# Patient Record
Sex: FEMALE | Race: WHITE | Employment: OTHER | URBAN - METROPOLITAN AREA
[De-identification: names, ages, dates, MRNs, and addresses within clinical notes are randomized per-mention and may not be internally consistent; named-entity substitution may affect disease eponyms.]

---

## 2018-04-25 PROBLEM — R60.0 LOCALIZED EDEMA: Status: ACTIVE | Noted: 2018-04-25

## 2018-06-20 ENCOUNTER — HOSPITAL ENCOUNTER (OUTPATIENT)
Dept: WOMENS IMAGING | Age: 78
Discharge: HOME | End: 2018-06-22
Payer: MEDICARE

## 2018-06-20 DIAGNOSIS — R93.6 ABNORMAL FINDINGS ON DIAGNOSTIC IMAGING OF LIMBS: ICD-10-CM

## 2018-06-20 DIAGNOSIS — Z13.820 ENCOUNTER FOR SCREENING FOR OSTEOPOROSIS: ICD-10-CM

## 2018-06-20 DIAGNOSIS — Z12.31 SCREENING MAMMOGRAM, ENCOUNTER FOR: ICD-10-CM

## 2018-06-20 PROCEDURE — 77080 DXA BONE DENSITY AXIAL: CPT

## 2018-06-20 PROCEDURE — 77063 BREAST TOMOSYNTHESIS BI: CPT

## 2018-12-03 RX ORDER — SIMVASTATIN 40 MG
TABLET ORAL
Qty: 90 TABLET | Refills: 2 | Status: SHIPPED | OUTPATIENT
Start: 2018-12-03 | End: 2019-09-01 | Stop reason: SDUPTHER

## 2018-12-10 ENCOUNTER — OFFICE VISIT (OUTPATIENT)
Dept: PRIMARY CARE CLINIC | Age: 78
End: 2018-12-10
Payer: MEDICARE

## 2018-12-10 VITALS
HEIGHT: 62 IN | RESPIRATION RATE: 16 BRPM | WEIGHT: 244.4 LBS | SYSTOLIC BLOOD PRESSURE: 112 MMHG | DIASTOLIC BLOOD PRESSURE: 82 MMHG | BODY MASS INDEX: 44.98 KG/M2 | OXYGEN SATURATION: 97 % | HEART RATE: 64 BPM

## 2018-12-10 DIAGNOSIS — E03.9 HYPOTHYROIDISM, UNSPECIFIED TYPE: ICD-10-CM

## 2018-12-10 DIAGNOSIS — I10 ESSENTIAL HYPERTENSION: ICD-10-CM

## 2018-12-10 DIAGNOSIS — H60.313 ACUTE DIFFUSE OTITIS EXTERNA OF BOTH EARS: ICD-10-CM

## 2018-12-10 DIAGNOSIS — R60.0 LOCALIZED EDEMA: ICD-10-CM

## 2018-12-10 DIAGNOSIS — Z23 NEED FOR PROPHYLACTIC VACCINATION AGAINST STREPTOCOCCUS PNEUMONIAE (PNEUMOCOCCUS): Primary | ICD-10-CM

## 2018-12-10 DIAGNOSIS — Z13.31 POSITIVE DEPRESSION SCREENING: ICD-10-CM

## 2018-12-10 DIAGNOSIS — H66.003 ACUTE SUPPURATIVE OTITIS MEDIA OF BOTH EARS WITHOUT SPONTANEOUS RUPTURE OF TYMPANIC MEMBRANES, RECURRENCE NOT SPECIFIED: ICD-10-CM

## 2018-12-10 DIAGNOSIS — Z91.81 AT HIGH RISK FOR FALLS: ICD-10-CM

## 2018-12-10 DIAGNOSIS — E78.00 PURE HYPERCHOLESTEROLEMIA: ICD-10-CM

## 2018-12-10 PROCEDURE — 1101F PT FALLS ASSESS-DOCD LE1/YR: CPT | Performed by: NURSE PRACTITIONER

## 2018-12-10 PROCEDURE — 1123F ACP DISCUSS/DSCN MKR DOCD: CPT | Performed by: NURSE PRACTITIONER

## 2018-12-10 PROCEDURE — G0444 DEPRESSION SCREEN ANNUAL: HCPCS | Performed by: NURSE PRACTITIONER

## 2018-12-10 PROCEDURE — G8399 PT W/DXA RESULTS DOCUMENT: HCPCS | Performed by: NURSE PRACTITIONER

## 2018-12-10 PROCEDURE — G0009 ADMIN PNEUMOCOCCAL VACCINE: HCPCS | Performed by: NURSE PRACTITIONER

## 2018-12-10 PROCEDURE — 90732 PPSV23 VACC 2 YRS+ SUBQ/IM: CPT | Performed by: NURSE PRACTITIONER

## 2018-12-10 PROCEDURE — 99214 OFFICE O/P EST MOD 30 MIN: CPT | Performed by: NURSE PRACTITIONER

## 2018-12-10 PROCEDURE — G8427 DOCREV CUR MEDS BY ELIG CLIN: HCPCS | Performed by: NURSE PRACTITIONER

## 2018-12-10 PROCEDURE — 4040F PNEUMOC VAC/ADMIN/RCVD: CPT | Performed by: NURSE PRACTITIONER

## 2018-12-10 PROCEDURE — G8482 FLU IMMUNIZE ORDER/ADMIN: HCPCS | Performed by: NURSE PRACTITIONER

## 2018-12-10 PROCEDURE — G8431 POS CLIN DEPRES SCRN F/U DOC: HCPCS | Performed by: NURSE PRACTITIONER

## 2018-12-10 PROCEDURE — 4130F TOPICAL PREP RX AOE: CPT | Performed by: NURSE PRACTITIONER

## 2018-12-10 PROCEDURE — 1036F TOBACCO NON-USER: CPT | Performed by: NURSE PRACTITIONER

## 2018-12-10 PROCEDURE — G8417 CALC BMI ABV UP PARAM F/U: HCPCS | Performed by: NURSE PRACTITIONER

## 2018-12-10 PROCEDURE — 1090F PRES/ABSN URINE INCON ASSESS: CPT | Performed by: NURSE PRACTITIONER

## 2018-12-10 RX ORDER — CIPROFLOXACIN AND DEXAMETHASONE 3; 1 MG/ML; MG/ML
4 SUSPENSION/ DROPS AURICULAR (OTIC) 2 TIMES DAILY
Qty: 1 BOTTLE | Refills: 0 | Status: SHIPPED | OUTPATIENT
Start: 2018-12-10 | End: 2018-12-17

## 2018-12-10 RX ORDER — DOXYCYCLINE HYCLATE 100 MG/1
100 CAPSULE ORAL 2 TIMES DAILY
Qty: 20 CAPSULE | Refills: 0 | Status: SHIPPED | OUTPATIENT
Start: 2018-12-10 | End: 2018-12-20

## 2018-12-10 ASSESSMENT — ENCOUNTER SYMPTOMS
SHORTNESS OF BREATH: 0
BACK PAIN: 1
VOMITING: 0
ABDOMINAL PAIN: 0
PHOTOPHOBIA: 0
COUGH: 1
RHINORRHEA: 1
NAUSEA: 1
SORE THROAT: 1

## 2018-12-10 ASSESSMENT — PATIENT HEALTH QUESTIONNAIRE - PHQ9
2. FEELING DOWN, DEPRESSED OR HOPELESS: 3
1. LITTLE INTEREST OR PLEASURE IN DOING THINGS: 0
SUM OF ALL RESPONSES TO PHQ9 QUESTIONS 1 & 2: 3
5. POOR APPETITE OR OVEREATING: 3
7. TROUBLE CONCENTRATING ON THINGS, SUCH AS READING THE NEWSPAPER OR WATCHING TELEVISION: 3
8. MOVING OR SPEAKING SO SLOWLY THAT OTHER PEOPLE COULD HAVE NOTICED. OR THE OPPOSITE, BEING SO FIGETY OR RESTLESS THAT YOU HAVE BEEN MOVING AROUND A LOT MORE THAN USUAL: 3
9. THOUGHTS THAT YOU WOULD BE BETTER OFF DEAD, OR OF HURTING YOURSELF: 0
10. IF YOU CHECKED OFF ANY PROBLEMS, HOW DIFFICULT HAVE THESE PROBLEMS MADE IT FOR YOU TO DO YOUR WORK, TAKE CARE OF THINGS AT HOME, OR GET ALONG WITH OTHER PEOPLE: 2
6. FEELING BAD ABOUT YOURSELF - OR THAT YOU ARE A FAILURE OR HAVE LET YOURSELF OR YOUR FAMILY DOWN: 2
SUM OF ALL RESPONSES TO PHQ QUESTIONS 1-9: 20
4. FEELING TIRED OR HAVING LITTLE ENERGY: 3
3. TROUBLE FALLING OR STAYING ASLEEP: 3
SUM OF ALL RESPONSES TO PHQ QUESTIONS 1-9: 20

## 2018-12-10 NOTE — PROGRESS NOTES
[address] [address] PRIMARY CARE  [address] [place] [id number]  Dept: [phone]    Bassam Thorpe is a [de-identified] y.o. female who presents today for her medical conditions/complaintsas noted below. Chief Complaint   Patient presents with    Otalgia     Patient c/o right ear pain that started about 2 weeks ago. At times she will get dizzy. HPI:     Otalgia    There is pain in both ears. This is a recurrent problem. Episode onset: left about 2-3weeks and right comes and goes for months. The problem has been gradually worsening. There has been no fever. The pain is mild. Associated symptoms include coughing, rhinorrhea and a sore throat (scratchy). Pertinent negatives include no abdominal pain, headaches, rash or vomiting. Depression is not so good. When she has 2-3 days of not seein anyone she does not want to get our of bed/. She thnkd prozac is workin gOK.     LDL Cholesterol (mg/dL)   Date Value   04/27/2012 92       (goal LDL is <100)   AST (U/L)   Date Value   02/10/2014 26     ALT (U/L)   Date Value   02/10/2014 31     BUN (mg/dL)   Date Value   05/16/2014 17     BP Readings from Last 3 Encounters:   12/10/18 112/82   08/30/18 122/82   05/08/18 138/78          (goal 120/80)    Past Medical History:   Diagnosis Date    Acute sinusitis     Anxiety     Arthritis     Attic perforation of tympanic membrane, right ear     Colitis     Cramp of limb     Depression     Difficulty breathing     Diverticulosis     Elbow injury     GERD (gastroesophageal reflux disease)     H/O acne     H/O acute bronchitis     H/O breast lump     H/O Clostridium difficile infection     H/O diarrhea     H/O diverticulitis of colon     H/O seborrheic keratosis     H/O: hypothyroidism     Hyperlipidemia     Hypertension     Hypothyroid     Joint pain, hip     OA (osteoarthritis)     Obesity     Polyuria     Shoulder injury     Urine, MG capsule TAKE 1 CAPSULE DAILY 90 capsule 2    levothyroxine (SYNTHROID) 50 MCG tablet TAKE 1 TABLET DAILY 90 tablet 2    esomeprazole (NEXIUM) 20 MG delayed release capsule TAKE 1 CAPSULE DAILY 90 capsule 3    lisinopril-hydrochlorothiazide (PRINZIDE;ZESTORETIC) 20-12.5 MG per tablet TAKE 1 TABLET DAILY 90 tablet 3    furosemide (LASIX) 20 MG tablet Take 1 tablet by mouth daily 90 tablet 3    tolterodine (DETROL LA) 4 MG extended release capsule TAKE 1 CAPSULE DAILY 90 capsule 3    ciclopirox (LOPROX) 0.77 % cream Apply topically 2 times daily. 30 g 0    ammonium lactate (LAC-HYDRIN) 12 % lotion Apply topically daily. 222 mL 3    polyethylene glycol (GLYCOLAX) powder Take 17 g by mouth daily Indications: Constipation      albuterol (PROVENTIL HFA;VENTOLIN HFA) 108 (90 BASE) MCG/ACT inhaler Inhale 2 puffs into the lungs every 6 hours as needed for Wheezing.  albuterol (PROVENTIL) (2.5 MG/3ML) 0.083% nebulizer solution Take 2.5 mg by nebulization every 4 hours as needed for Wheezing        No current facility-administered medications for this visit. Allergies   Allergen Reactions    Bactrim [Sulfamethoxazole-Trimethoprim]     Codeine     Penicillins     Percocet [Oxycodone-Acetaminophen]        Health Maintenance   Topic Date Due    DTaP/Tdap/Td vaccine (1 - Tdap) 08/19/1957    Shingles Vaccine (1 of 2 - 2 Dose Series) 08/19/1988    Potassium monitoring  05/08/2019    Creatinine monitoring  05/08/2019    DEXA (modify frequency per FRAX score)  Completed    Flu vaccine  Completed    Pneumococcal low/med risk  Completed       Subjective:      Review of Systems   Constitutional: Positive for fatigue. Negative for fever. HENT: Positive for ear pain, rhinorrhea and sore throat (scratchy). Eyes: Negative for photophobia and visual disturbance. Respiratory: Positive for cough. Negative for shortness of breath. Cardiovascular: Negative for chest pain, palpitations and leg swelling.

## 2018-12-10 NOTE — PATIENT INSTRUCTIONS
closely for changes in your health, and be sure to contact your doctor if:    · You have new or worse symptoms.     · You are not getting better after taking an antibiotic for 2 days. Where can you learn more? Go to [email or web address] and sign in to your [employer] account. Enter U753 in the [employer] box to learn more about \"Ear Infection (Otitis Media): Care Instructions. \"     If you do not have an account, please click on the \"Sign Up Now\" link. Current as of: March 28, 2018  Content Version: 11.8  © 6208-4537 Healthwise, Incorporated. Care instructions adapted under license by [facility] ([facility]). If you have questions about a medical condition or this instruction, always ask your healthcare professional. [address] any warranty or liability for your use of this information.

## 2019-04-08 DIAGNOSIS — R60.0 LOCALIZED EDEMA: ICD-10-CM

## 2019-04-08 RX ORDER — FUROSEMIDE 20 MG/1
TABLET ORAL
Qty: 90 TABLET | Refills: 3 | Status: SHIPPED | OUTPATIENT
Start: 2019-04-08 | End: 2020-01-28 | Stop reason: ALTCHOICE

## 2019-04-08 NOTE — TELEPHONE ENCOUNTER
Last visit: 306736    Last Med refill: 4/2718 #90 3 refill       Does patient have enough medication for 72 hours:     Next Visit Date:  No future appointments.     Health Maintenance   Topic Date Due    DTaP/Tdap/Td vaccine (1 - Tdap) 08/19/1957    Shingles Vaccine (1 of 2) 08/19/1988    Potassium monitoring  05/08/2019    Creatinine monitoring  05/08/2019    DEXA (modify frequency per FRAX score)  Completed    Flu vaccine  Completed    Pneumococcal 65+ years Vaccine  Completed       No results found for: LABA1C          ( goal A1C is < 7)   No results found for: LABMICR  LDL Cholesterol (mg/dL)   Date Value   04/27/2012 92       (goal LDL is <100)   AST (U/L)   Date Value   02/10/2014 26     ALT (U/L)   Date Value   02/10/2014 31     BUN (mg/dL)   Date Value   05/16/2014 17     BP Readings from Last 3 Encounters:   12/10/18 112/82   08/30/18 122/82   05/08/18 138/78          (goal 120/80)    All Future Testing planned in [facility]  Lab Frequency Next Occurrence   XR KNEE LEFT (1-2 VIEWS) Once 08/30/2018   CBC Once 03/19/2019   Lipid Panel Once 03/19/2019   Hepatic Function Panel Once 18/22/2293   Basic Metabolic Panel Once 21/60/6507   TSH With Reflex Ft4 Once 03/19/2019               Patient Active Problem List:     Complete rotator cuff tear     Constipation     Depression with anxiety     Esophageal reflux     Fatigue     Hyperlipidemia     Hypertension     Impingement syndrome of right shoulder     Joint pain, knee     Lumbar radiculopathy     Morbid obesity (HCC)     Osteoarthritis     Pain, joint, shoulder     Renal insufficiency     Urinary incontinence     Localized edema

## 2019-05-14 RX ORDER — LISINOPRIL AND HYDROCHLOROTHIAZIDE 20; 12.5 MG/1; MG/1
TABLET ORAL
Qty: 90 TABLET | Refills: 3 | Status: SHIPPED | OUTPATIENT
Start: 2019-05-14 | End: 2020-05-08

## 2019-05-26 DIAGNOSIS — K21.9 GASTROESOPHAGEAL REFLUX DISEASE WITHOUT ESOPHAGITIS: ICD-10-CM

## 2019-06-24 RX ORDER — LEVOTHYROXINE SODIUM 0.05 MG/1
TABLET ORAL
Qty: 90 TABLET | Refills: 2 | Status: SHIPPED | OUTPATIENT
Start: 2019-06-24 | End: 2020-03-20

## 2019-08-26 DIAGNOSIS — F41.8 DEPRESSION WITH ANXIETY: ICD-10-CM

## 2019-08-26 RX ORDER — FLUOXETINE HYDROCHLORIDE 40 MG/1
CAPSULE ORAL
Qty: 90 CAPSULE | Refills: 4 | Status: SHIPPED | OUTPATIENT
Start: 2019-08-26 | End: 2020-11-20

## 2019-08-28 ENCOUNTER — OFFICE VISIT (OUTPATIENT)
Dept: PRIMARY CARE CLINIC | Age: 79
End: 2019-08-28
Payer: MEDICARE

## 2019-08-28 VITALS
WEIGHT: 246.4 LBS | DIASTOLIC BLOOD PRESSURE: 74 MMHG | OXYGEN SATURATION: 98 % | HEIGHT: 62 IN | BODY MASS INDEX: 45.34 KG/M2 | SYSTOLIC BLOOD PRESSURE: 124 MMHG | HEART RATE: 75 BPM

## 2019-08-28 DIAGNOSIS — N39.46 MIXED STRESS AND URGE URINARY INCONTINENCE: ICD-10-CM

## 2019-08-28 DIAGNOSIS — Z12.31 ENCOUNTER FOR SCREENING MAMMOGRAM FOR MALIGNANT NEOPLASM OF BREAST: ICD-10-CM

## 2019-08-28 DIAGNOSIS — E78.00 PURE HYPERCHOLESTEROLEMIA: ICD-10-CM

## 2019-08-28 DIAGNOSIS — H65.21 RIGHT CHRONIC SEROUS OTITIS MEDIA: ICD-10-CM

## 2019-08-28 DIAGNOSIS — E66.01 MORBID OBESITY (HCC): ICD-10-CM

## 2019-08-28 DIAGNOSIS — I10 ESSENTIAL HYPERTENSION: Primary | ICD-10-CM

## 2019-08-28 DIAGNOSIS — E03.9 HYPOTHYROIDISM, UNSPECIFIED TYPE: ICD-10-CM

## 2019-08-28 PROCEDURE — G8399 PT W/DXA RESULTS DOCUMENT: HCPCS | Performed by: FAMILY MEDICINE

## 2019-08-28 PROCEDURE — 1036F TOBACCO NON-USER: CPT | Performed by: FAMILY MEDICINE

## 2019-08-28 PROCEDURE — 4040F PNEUMOC VAC/ADMIN/RCVD: CPT | Performed by: FAMILY MEDICINE

## 2019-08-28 PROCEDURE — 0509F URINE INCON PLAN DOCD: CPT | Performed by: FAMILY MEDICINE

## 2019-08-28 PROCEDURE — 1123F ACP DISCUSS/DSCN MKR DOCD: CPT | Performed by: FAMILY MEDICINE

## 2019-08-28 PROCEDURE — 99213 OFFICE O/P EST LOW 20 MIN: CPT | Performed by: FAMILY MEDICINE

## 2019-08-28 PROCEDURE — 1090F PRES/ABSN URINE INCON ASSESS: CPT | Performed by: FAMILY MEDICINE

## 2019-08-28 PROCEDURE — G8417 CALC BMI ABV UP PARAM F/U: HCPCS | Performed by: FAMILY MEDICINE

## 2019-08-28 PROCEDURE — G8427 DOCREV CUR MEDS BY ELIG CLIN: HCPCS | Performed by: FAMILY MEDICINE

## 2019-08-28 RX ORDER — DOXYCYCLINE HYCLATE 100 MG
100 TABLET ORAL 2 TIMES DAILY
Qty: 40 TABLET | Refills: 0 | Status: SHIPPED | OUTPATIENT
Start: 2019-08-28 | End: 2019-09-17

## 2019-08-28 RX ORDER — TOLTERODINE 4 MG/1
CAPSULE, EXTENDED RELEASE ORAL
Qty: 90 CAPSULE | Refills: 3 | Status: SHIPPED | OUTPATIENT
Start: 2019-08-28 | End: 2019-09-26

## 2019-08-28 ASSESSMENT — PATIENT HEALTH QUESTIONNAIRE - PHQ9
SUM OF ALL RESPONSES TO PHQ QUESTIONS 1-9: 2
1. LITTLE INTEREST OR PLEASURE IN DOING THINGS: 1
2. FEELING DOWN, DEPRESSED OR HOPELESS: 1
SUM OF ALL RESPONSES TO PHQ QUESTIONS 1-9: 2
SUM OF ALL RESPONSES TO PHQ9 QUESTIONS 1 & 2: 2

## 2019-08-28 ASSESSMENT — ENCOUNTER SYMPTOMS
RHINORRHEA: 0
ABDOMINAL PAIN: 0
NAUSEA: 0
SORE THROAT: 0
WHEEZING: 0
COUGH: 0
EYE REDNESS: 0
DIARRHEA: 0
SHORTNESS OF BREATH: 0
VOMITING: 0
EYE DISCHARGE: 0

## 2019-08-28 NOTE — PROGRESS NOTES
well-nourished. No distress. HENT:   Head: Normocephalic and atraumatic. Mouth/Throat: Oropharynx is clear and moist.   Right TM dull with serous pustular fluid behind the tympanic membrane   Eyes: Pupils are equal, round, and reactive to light. Conjunctivae are normal. Right eye exhibits no discharge. Left eye exhibits no discharge. No scleral icterus. Neck: No tracheal deviation present. No thyromegaly present. Cardiovascular: Normal rate, regular rhythm and normal heart sounds. No carotid bruits   Pulmonary/Chest: Effort normal and breath sounds normal. No respiratory distress. She has no wheezes. Musculoskeletal: She exhibits no edema. Lymphadenopathy:     She has no cervical adenopathy. Neurological: She is alert and oriented to person, place, and time. Skin: Skin is warm. No rash noted. Psychiatric: She has a normal mood and affect. Her behavior is normal. Thought content normal.   Nursing note and vitals reviewed. 1. Essential hypertension  Stable. Lab ordered   - Basic Metabolic Panel, Fasting; Future    2. Pure hypercholesterolemia  Lab ordered  - Lipid, Fasting; Future    3. Hypothyroidism, unspecified type  Lab ordered  - TSH; Future  - T4, Free; Future    4. Mixed stress and urge urinary incontinence  Renew medication  - tolterodine (DETROL LA) 4 MG extended release capsule; TAKE 1 CAPSULE DAILY  Dispense: 90 capsule; Refill: 3    5. Encounter for screening mammogram for malignant neoplasm of breast  Mammogram ordered  - [facility] DIGITAL SCREEN W OR WO CAD BILATERAL; Future    6. Right chronic serous otitis media  Doxy for 3 weeks  - doxycycline hyclate (VIBRA-TABS) 100 MG tablet; Take 1 tablet by mouth 2 times daily for 20 days  Dispense: 40 tablet; Refill: 0    7. Morbid Obesity. Stable. Pt not inclined to do diet or exercise     Assessment:       Diagnosis Orders   1. Essential hypertension  Basic Metabolic Panel, Fasting   2. Pure hypercholesterolemia  Lipid, Fasting   3. Hypothyroidism, unspecified type  TSH    T4, Free   4. Mixed stress and urge urinary incontinence  tolterodine (DETROL LA) 4 MG extended release capsule   5. Encounter for screening mammogram for malignant neoplasm of breast  [facility] DIGITAL SCREEN W OR WO CAD BILATERAL   6. Right chronic serous otitis media  doxycycline hyclate (VIBRA-TABS) 100 MG tablet   7. Morbid obesity ([address]          Plan:    Blood work ordered. Mammogram ordered per patient request.  Doxycycline for 3 weeks for right otitis media. Renew medications    Return in about 3 months (around 11/28/2019), or Medicare wellness. Orders Placed This Encounter   Procedures    [facility] DIGITAL SCREEN W OR WO CAD BILATERAL     Standing Status:   Future     Standing Expiration Date:   10/28/2020     Order Specific Question:   Reason for exam:     Answer:   screen    Lipid, Fasting     Standing Status:   Future     Standing Expiration Date:   8/28/2020    Basic Metabolic Panel, Fasting     Standing Status:   Future     Standing Expiration Date:   8/28/2020    TSH     Standing Status:   Future     Standing Expiration Date:   8/28/2020    T4, Free     Standing Status:   Future     Standing Expiration Date:   8/28/2020     Orders Placed This Encounter   Medications    doxycycline hyclate (VIBRA-TABS) 100 MG tablet     Sig: Take 1 tablet by mouth 2 times daily for 20 days     Dispense:  40 tablet     Refill:  0    tolterodine (DETROL LA) 4 MG extended release capsule     Sig: TAKE 1 CAPSULE DAILY     Dispense:  90 capsule     Refill:  3       Patient given educationalmaterials - see patient instructions. Discussed use, benefit, and side effectsof prescribed medications. All patient questions answered. Pt voiced understanding. Reviewed health maintenance. Instructed to continue current medications, diet andexercise. Patient agreed with treatment plan. Follow up as directed.      Electronicallysigned by Segundo Faustin MD on 8/28/2019 at 2:08 PM

## 2019-09-03 RX ORDER — SIMVASTATIN 40 MG
TABLET ORAL
Qty: 90 TABLET | Refills: 4 | Status: SHIPPED | OUTPATIENT
Start: 2019-09-03 | End: 2020-11-25

## 2019-09-26 ENCOUNTER — OFFICE VISIT (OUTPATIENT)
Dept: PRIMARY CARE CLINIC | Age: 79
End: 2019-09-26
Payer: MEDICARE

## 2019-09-26 VITALS
HEIGHT: 62 IN | SYSTOLIC BLOOD PRESSURE: 136 MMHG | TEMPERATURE: 98.3 F | BODY MASS INDEX: 46.01 KG/M2 | OXYGEN SATURATION: 98 % | HEART RATE: 72 BPM | DIASTOLIC BLOOD PRESSURE: 82 MMHG | WEIGHT: 250 LBS

## 2019-09-26 DIAGNOSIS — H65.91 OME (OTITIS MEDIA WITH EFFUSION), RIGHT: Primary | ICD-10-CM

## 2019-09-26 DIAGNOSIS — Z23 NEED FOR VACCINATION: ICD-10-CM

## 2019-09-26 DIAGNOSIS — J01.00 ACUTE NON-RECURRENT MAXILLARY SINUSITIS: ICD-10-CM

## 2019-09-26 DIAGNOSIS — H92.11 EAR DRAINAGE RIGHT: ICD-10-CM

## 2019-09-26 PROCEDURE — G8399 PT W/DXA RESULTS DOCUMENT: HCPCS | Performed by: PHYSICIAN ASSISTANT

## 2019-09-26 PROCEDURE — 99213 OFFICE O/P EST LOW 20 MIN: CPT | Performed by: PHYSICIAN ASSISTANT

## 2019-09-26 PROCEDURE — G8427 DOCREV CUR MEDS BY ELIG CLIN: HCPCS | Performed by: PHYSICIAN ASSISTANT

## 2019-09-26 PROCEDURE — 90653 IIV ADJUVANT VACCINE IM: CPT | Performed by: PHYSICIAN ASSISTANT

## 2019-09-26 PROCEDURE — 1090F PRES/ABSN URINE INCON ASSESS: CPT | Performed by: PHYSICIAN ASSISTANT

## 2019-09-26 PROCEDURE — G8417 CALC BMI ABV UP PARAM F/U: HCPCS | Performed by: PHYSICIAN ASSISTANT

## 2019-09-26 PROCEDURE — G0008 ADMIN INFLUENZA VIRUS VAC: HCPCS | Performed by: PHYSICIAN ASSISTANT

## 2019-09-26 PROCEDURE — 1123F ACP DISCUSS/DSCN MKR DOCD: CPT | Performed by: PHYSICIAN ASSISTANT

## 2019-09-26 PROCEDURE — 1036F TOBACCO NON-USER: CPT | Performed by: PHYSICIAN ASSISTANT

## 2019-09-26 PROCEDURE — 4040F PNEUMOC VAC/ADMIN/RCVD: CPT | Performed by: PHYSICIAN ASSISTANT

## 2019-09-26 RX ORDER — CEFDINIR 300 MG/1
300 CAPSULE ORAL 2 TIMES DAILY
Qty: 20 CAPSULE | Refills: 0 | Status: SHIPPED | OUTPATIENT
Start: 2019-09-26 | End: 2019-10-06

## 2019-09-26 RX ORDER — FLUTICASONE PROPIONATE 50 MCG
2 SPRAY, SUSPENSION (ML) NASAL DAILY
Qty: 1 BOTTLE | Refills: 0 | Status: SHIPPED | OUTPATIENT
Start: 2019-09-26 | End: 2019-10-19 | Stop reason: SDUPTHER

## 2019-09-26 RX ORDER — TOLTERODINE 2 MG/1
4 CAPSULE, EXTENDED RELEASE ORAL DAILY
COMMUNITY
End: 2020-09-30

## 2019-09-26 ASSESSMENT — ENCOUNTER SYMPTOMS
WHEEZING: 0
SHORTNESS OF BREATH: 0

## 2019-10-03 ENCOUNTER — HOSPITAL ENCOUNTER (OUTPATIENT)
Dept: WOMENS IMAGING | Age: 79
Discharge: HOME | End: 2019-10-05
Payer: MEDICARE

## 2019-10-03 DIAGNOSIS — Z12.31 ENCOUNTER FOR SCREENING MAMMOGRAM FOR MALIGNANT NEOPLASM OF BREAST: ICD-10-CM

## 2019-10-03 PROCEDURE — 77063 BREAST TOMOSYNTHESIS BI: CPT

## 2019-10-03 ASSESSMENT — ENCOUNTER SYMPTOMS
DIARRHEA: 0
SINUS PRESSURE: 1
RHINORRHEA: 0
COUGH: 1
VOMITING: 0
NAUSEA: 0

## 2019-10-19 DIAGNOSIS — H65.91 OME (OTITIS MEDIA WITH EFFUSION), RIGHT: ICD-10-CM

## 2019-10-19 DIAGNOSIS — J01.00 ACUTE NON-RECURRENT MAXILLARY SINUSITIS: ICD-10-CM

## 2019-10-21 RX ORDER — FLUTICASONE PROPIONATE 50 MCG
SPRAY, SUSPENSION (ML) NASAL
Qty: 1 BOTTLE | Refills: 5 | Status: SHIPPED | OUTPATIENT
Start: 2019-10-21 | End: 2020-01-28 | Stop reason: ALTCHOICE

## 2020-01-26 ENCOUNTER — HOSPITAL ENCOUNTER (EMERGENCY)
Age: 80
Discharge: HOME | End: 2020-01-26
Attending: EMERGENCY MEDICINE
Payer: MEDICARE

## 2020-01-26 VITALS
WEIGHT: 250 LBS | OXYGEN SATURATION: 97 % | HEIGHT: 62 IN | HEART RATE: 56 BPM | SYSTOLIC BLOOD PRESSURE: 147 MMHG | DIASTOLIC BLOOD PRESSURE: 73 MMHG | TEMPERATURE: 97.5 F | BODY MASS INDEX: 46.01 KG/M2 | RESPIRATION RATE: 18 BRPM

## 2020-01-26 PROCEDURE — 99283 EMERGENCY DEPT VISIT LOW MDM: CPT

## 2020-01-26 PROCEDURE — 6370000000 HC RX 637 (ALT 250 FOR IP): Performed by: EMERGENCY MEDICINE

## 2020-01-26 PROCEDURE — 93005 ELECTROCARDIOGRAM TRACING: CPT

## 2020-01-26 RX ORDER — HYDROCODONE BITARTRATE AND ACETAMINOPHEN 5; 325 MG/1; MG/1
1 TABLET ORAL ONCE
Status: COMPLETED | OUTPATIENT
Start: 2020-01-26 | End: 2020-01-26

## 2020-01-26 RX ORDER — HYDROCODONE BITARTRATE AND ACETAMINOPHEN 5; 325 MG/1; MG/1
1 TABLET ORAL EVERY 6 HOURS PRN
Qty: 12 TABLET | Refills: 0 | Status: SHIPPED | OUTPATIENT
Start: 2020-01-26 | End: 2020-01-29

## 2020-01-26 RX ADMIN — HYDROCODONE BITARTRATE AND ACETAMINOPHEN 1 TABLET: 5; 325 TABLET ORAL at 17:34

## 2020-01-26 ASSESSMENT — ENCOUNTER SYMPTOMS
EYES NEGATIVE: 1
RESPIRATORY NEGATIVE: 1
ABDOMINAL PAIN: 0
COUGH: 0
SHORTNESS OF BREATH: 0
GASTROINTESTINAL NEGATIVE: 1

## 2020-01-26 ASSESSMENT — PAIN DESCRIPTION - PAIN TYPE: TYPE: ACUTE PAIN

## 2020-01-26 ASSESSMENT — PAIN DESCRIPTION - ORIENTATION: ORIENTATION: LEFT

## 2020-01-26 ASSESSMENT — PAIN DESCRIPTION - DESCRIPTORS: DESCRIPTORS: THROBBING

## 2020-01-26 ASSESSMENT — PAIN SCALES - GENERAL
PAINLEVEL_OUTOF10: 10
PAINLEVEL_OUTOF10: 8

## 2020-01-26 NOTE — ED PROVIDER NOTES
DAILY    POLYETHYLENE GLYCOL (GLYCOLAX) POWDER    Take 17 g by mouth daily Indications: Constipation    SIMVASTATIN (ZOCOR) 40 MG TABLET    TAKE 1 TABLET DAILY    TOLTERODINE (DETROL LA) 2 MG EXTENDED RELEASE CAPSULE    Take 4 mg by mouth daily     ALLERGIES     is allergic to doxycycline; bactrim [sulfamethoxazole-trimethoprim]; codeine; penicillins; and percocet [oxycodone-acetaminophen]. FAMILY HISTORY     She indicated that the status of her mother is unknown. She indicated that the status of her father is unknown. She indicated that the status of her brother is unknown. SOCIAL HISTORY       Social History     Tobacco Use    Smoking status: Former Smoker     Packs/day: 1.00     Years: 40.00     Pack years: 40.00     Types: Cigarettes     Last attempt to quit: 4/3/1999     Years since quittin.8    Smokeless tobacco: Never Used   Substance Use Topics    Alcohol use: Yes     Comment: socially     Drug use: No     PHYSICAL EXAM     INITIAL VITALS: BP (!) 147/73   Pulse 56   Temp 97.5 °F (36.4 °C) (Oral)   Resp 18   Ht 5' 2\" (1.575 m)   Wt 250 lb (113.4 kg)   SpO2 97%   BMI 45.73 kg/m²    Physical Exam  Vitals signs and nursing note reviewed. Constitutional:       Appearance: Normal appearance. HENT:      Head: Normocephalic and atraumatic. Nose: No congestion. Mouth/Throat:      Mouth: Mucous membranes are moist.   Eyes:      Conjunctiva/sclera: Conjunctivae normal.   Neck:      Musculoskeletal: Normal range of motion and neck supple. Cardiovascular:      Rate and Rhythm: Normal rate and regular rhythm. Heart sounds: No murmur. Pulmonary:      Effort: Pulmonary effort is normal.      Breath sounds: Normal breath sounds. Abdominal:      Palpations: Abdomen is soft. Tenderness: There is no abdominal tenderness. Musculoskeletal:      Comments: Mild tenderness to wrist, elbow and shoulder on the left. Also right shoulder.   No erythema, warmth, induration, edema or every 6 hours as needed for Pain for up to 3 days. Intended supply: 3 days. Take lowest dose possible to manage pain     Dispense:  12 tablet     Refill:  0     CONSULTS:  None    FINAL IMPRESSION      1. Arthralgia, unspecified joint          DISPOSITION/PLAN   DISPOSITION Decision To Discharge 01/26/2020 05:24:56 PM      PATIENT REFERRED TO:  Kristie Alas MD  [phone] [facility]  [phone]    Call in 1 day      DISCHARGE MEDICATIONS:  New Prescriptions    HYDROCODONE-ACETAMINOPHEN (NORCO) 5-325 MG PER TABLET    Take 1 tablet by mouth every 6 hours as needed for Pain for up to 3 days. Intended supply: 3 days.  Take lowest dose possible to manage pain     Teresa Cohn MD  Attending Emergency Physician                    Julián Vee MD  01/26/20 1119

## 2020-01-26 NOTE — ED NOTES
Pt upset over wait time; [place]; pt informed that the attending will be with her momentarily; pt stated \"yeah another damn half hour. \"  Friend present; pt denies further complaints of needs at this time.      Maria M Ray RN  01/26/20 [id number]

## 2020-01-28 ENCOUNTER — OFFICE VISIT (OUTPATIENT)
Dept: PRIMARY CARE CLINIC | Age: 80
End: 2020-01-28
Payer: MEDICARE

## 2020-01-28 VITALS
RESPIRATION RATE: 16 BRPM | HEIGHT: 62 IN | OXYGEN SATURATION: 95 % | HEART RATE: 76 BPM | BODY MASS INDEX: 45.9 KG/M2 | DIASTOLIC BLOOD PRESSURE: 78 MMHG | SYSTOLIC BLOOD PRESSURE: 126 MMHG | WEIGHT: 249.4 LBS

## 2020-01-28 LAB
EKG ATRIAL RATE: 60 BPM
EKG P AXIS: 60 DEGREES
EKG P-R INTERVAL: 156 MS
EKG Q-T INTERVAL: 458 MS
EKG QRS DURATION: 130 MS
EKG QTC CALCULATION (BAZETT): 458 MS
EKG R AXIS: -37 DEGREES
EKG T AXIS: 38 DEGREES
EKG VENTRICULAR RATE: 60 BPM

## 2020-01-28 PROCEDURE — 1123F ACP DISCUSS/DSCN MKR DOCD: CPT | Performed by: NURSE PRACTITIONER

## 2020-01-28 PROCEDURE — G8482 FLU IMMUNIZE ORDER/ADMIN: HCPCS | Performed by: NURSE PRACTITIONER

## 2020-01-28 PROCEDURE — 93010 ELECTROCARDIOGRAM REPORT: CPT | Performed by: INTERNAL MEDICINE

## 2020-01-28 PROCEDURE — 1036F TOBACCO NON-USER: CPT | Performed by: NURSE PRACTITIONER

## 2020-01-28 PROCEDURE — 1090F PRES/ABSN URINE INCON ASSESS: CPT | Performed by: NURSE PRACTITIONER

## 2020-01-28 PROCEDURE — G8427 DOCREV CUR MEDS BY ELIG CLIN: HCPCS | Performed by: NURSE PRACTITIONER

## 2020-01-28 PROCEDURE — 99213 OFFICE O/P EST LOW 20 MIN: CPT | Performed by: NURSE PRACTITIONER

## 2020-01-28 PROCEDURE — G8417 CALC BMI ABV UP PARAM F/U: HCPCS | Performed by: NURSE PRACTITIONER

## 2020-01-28 PROCEDURE — G8399 PT W/DXA RESULTS DOCUMENT: HCPCS | Performed by: NURSE PRACTITIONER

## 2020-01-28 PROCEDURE — 4040F PNEUMOC VAC/ADMIN/RCVD: CPT | Performed by: NURSE PRACTITIONER

## 2020-01-28 RX ORDER — PREDNISONE 20 MG/1
20 TABLET ORAL DAILY
Qty: 10 TABLET | Refills: 0 | Status: SHIPPED | OUTPATIENT
Start: 2020-01-28 | End: 2020-02-07

## 2020-01-28 RX ORDER — GABAPENTIN 300 MG/1
300 CAPSULE ORAL 3 TIMES DAILY
Qty: 90 CAPSULE | Refills: 5 | Status: SHIPPED | OUTPATIENT
Start: 2020-01-28 | End: 2020-07-24

## 2020-01-28 ASSESSMENT — ENCOUNTER SYMPTOMS
DIARRHEA: 0
COUGH: 0
SHORTNESS OF BREATH: 0
BACK PAIN: 1
CONSTIPATION: 0

## 2020-01-28 NOTE — PROGRESS NOTES
[address]  PRIMARY CARE  [address]  Dept: [phone] Donnell Zarate is a 78 y.o. female who presents today for her medical conditions/complaintsas noted below. Chief Complaint   Patient presents with    Follow-Up from Hospital     Patient went to [facility] ED for shoulder, neck and head pain. States the pain medication she was given is not strong enough. HPI:     HPI   Starts in back of head and move down neck into shoulder and down arm  and it is much worse Left sided shoulder pain   Sometimes cannot is and cannot lay. Sometimes 10/10. Left hand numbness, tingling and weakness. She is exercising shoulder 4-5x per day for a few minutes  She was in [facility] ER on 1/26/2020 received prescription for Norco - prescription and she states it is not helping.   Pain level now 8/10  LDL Cholesterol (mg/dL)   Date Value   04/27/2012 92       (goal LDL is <100)   AST (U/L)   Date Value   02/10/2014 26     ALT (U/L)   Date Value   02/10/2014 31     BUN (mg/dL)   Date Value   05/16/2014 17     BP Readings from Last 3 Encounters:   01/28/20 126/78   01/26/20 (!) 147/73   09/26/19 136/82          (goal 120/80)    Past Medical History:   Diagnosis Date    Acute sinusitis     Anxiety     Arthritis     Attic perforation of tympanic membrane, right ear     Colitis     Cramp of limb     Depression     Difficulty breathing     Diverticulosis     Elbow injury     GERD (gastroesophageal reflux disease)     H/O acne     H/O acute bronchitis     H/O breast lump     H/O Clostridium difficile infection     H/O diarrhea     H/O diverticulitis of colon     H/O seborrheic keratosis     H/O: hypothyroidism     Hyperlipidemia     Hypertension     Hypothyroid     Joint pain, hip     OA (osteoarthritis)     Obesity     Polyuria     Shoulder injury     Urine, incontinence, stress female     Varicose vein possible to manage pain 12 tablet 0    tolterodine (DETROL LA) 2 MG extended release capsule Take 4 mg by mouth daily      simvastatin (ZOCOR) 40 MG tablet TAKE 1 TABLET DAILY 90 tablet 4    FLUoxetine (PROZAC) 40 MG capsule TAKE 1 CAPSULE DAILY 90 capsule 4    levothyroxine (SYNTHROID) 50 MCG tablet TAKE 1 TABLET DAILY 90 tablet 2    esomeprazole (NEXIUM) 20 MG delayed release capsule TAKE 1 CAPSULE DAILY 90 capsule 3    lisinopril-hydrochlorothiazide (PRINZIDE;ZESTORETIC) 20-12.5 MG per tablet TAKE 1 TABLET DAILY 90 tablet 3    ciclopirox (LOPROX) 0.77 % cream Apply topically 2 times daily. 30 g 0    polyethylene glycol (GLYCOLAX) powder Take 17 g by mouth daily Indications: Constipation       No current facility-administered medications for this visit. Allergies   Allergen Reactions    Doxycycline Other (See Comments)     dizzyness    Bactrim [Sulfamethoxazole-Trimethoprim]     Codeine     Penicillins     Percocet [Oxycodone-Acetaminophen]        Health Maintenance   Topic Date Due    DTaP/Tdap/Td vaccine (1 - Tdap) 08/19/1949    Shingles Vaccine (1 of 2) 08/19/1988    Lipid screen  11/09/2018    Potassium monitoring  05/08/2019    Creatinine monitoring  05/08/2019    Annual Wellness Visit (AWV)  06/23/2019    DEXA (modify frequency per FRAX score)  Completed    Flu vaccine  Completed    Pneumococcal 65+ years Vaccine  Completed       Subjective:      Review of Systems   Constitutional: Positive for fatigue. Negative for chills and fever. HENT: Negative for congestion and nosebleeds. Respiratory: Negative for cough and shortness of breath. Cardiovascular: Negative for chest pain and leg swelling. Gastrointestinal: Negative for constipation and diarrhea. Endocrine: Negative for polyphagia and polyuria. Genitourinary: Negative for difficulty urinating and dysuria. Musculoskeletal: Positive for arthralgias, back pain and gait problem. Skin: Negative for rash and wound.

## 2020-02-20 ENCOUNTER — HOSPITAL ENCOUNTER (OUTPATIENT)
Age: 80
Discharge: HOME | End: 2020-02-22
Payer: MEDICARE

## 2020-02-20 ENCOUNTER — HOSPITAL ENCOUNTER (OUTPATIENT)
Age: 80
Discharge: HOME | End: 2020-02-20
Payer: MEDICARE

## 2020-02-20 ENCOUNTER — HOSPITAL ENCOUNTER (OUTPATIENT)
Dept: PAIN MANAGEMENT | Age: 80
Discharge: HOME | End: 2020-02-20
Payer: MEDICARE

## 2020-02-20 ENCOUNTER — HOSPITAL ENCOUNTER (OUTPATIENT)
Dept: GENERAL RADIOLOGY | Age: 80
Discharge: HOME | End: 2020-02-22
Payer: MEDICARE

## 2020-02-20 VITALS
DIASTOLIC BLOOD PRESSURE: 72 MMHG | OXYGEN SATURATION: 9 % | SYSTOLIC BLOOD PRESSURE: 104 MMHG | RESPIRATION RATE: 16 BRPM | HEIGHT: 62 IN | WEIGHT: 250 LBS | TEMPERATURE: 97.6 F | BODY MASS INDEX: 46.01 KG/M2 | HEART RATE: 87 BPM

## 2020-02-20 LAB
ANION GAP SERPL CALCULATED.3IONS-SCNC: 12 MMOL/L (ref 9–17)
BUN BLDV-MCNC: 30 MG/DL (ref 8–23)
BUN/CREAT BLD: ABNORMAL (ref 9–20)
CALCIUM SERPL-MCNC: 9.9 MG/DL (ref 8.6–10.4)
CHLORIDE BLD-SCNC: 102 MMOL/L (ref 98–107)
CHOLESTEROL, FASTING: 183 MG/DL
CHOLESTEROL/HDL RATIO: 2.9
CO2: 25 MMOL/L (ref 20–31)
CREAT SERPL-MCNC: 1.43 MG/DL (ref 0.5–0.9)
GFR AFRICAN AMERICAN: 43 ML/MIN
GFR NON-AFRICAN AMERICAN: 35 ML/MIN
GFR SERPL CREATININE-BSD FRML MDRD: ABNORMAL ML/MIN/{1.73_M2}
GFR SERPL CREATININE-BSD FRML MDRD: ABNORMAL ML/MIN/{1.73_M2}
GLUCOSE FASTING: 112 MG/DL (ref 70–99)
HDLC SERPL-MCNC: 63 MG/DL
LDL CHOLESTEROL: 97 MG/DL (ref 0–130)
POTASSIUM SERPL-SCNC: 4.9 MMOL/L (ref 3.7–5.3)
SODIUM BLD-SCNC: 139 MMOL/L (ref 135–144)
THYROXINE, FREE: 0.98 NG/DL (ref 0.93–1.7)
TRIGLYCERIDE, FASTING: 115 MG/DL
TSH SERPL DL<=0.05 MIU/L-ACNC: 1.92 MIU/L (ref 0.3–5)
VLDLC SERPL CALC-MCNC: NORMAL MG/DL (ref 1–30)

## 2020-02-20 PROCEDURE — 72050 X-RAY EXAM NECK SPINE 4/5VWS: CPT

## 2020-02-20 PROCEDURE — 99204 OFFICE O/P NEW MOD 45 MIN: CPT | Performed by: PAIN MEDICINE

## 2020-02-20 PROCEDURE — 84439 ASSAY OF FREE THYROXINE: CPT

## 2020-02-20 PROCEDURE — 84443 ASSAY THYROID STIM HORMONE: CPT

## 2020-02-20 PROCEDURE — 99203 OFFICE O/P NEW LOW 30 MIN: CPT

## 2020-02-20 PROCEDURE — 80048 BASIC METABOLIC PNL TOTAL CA: CPT

## 2020-02-20 PROCEDURE — 36415 COLL VENOUS BLD VENIPUNCTURE: CPT

## 2020-02-20 PROCEDURE — 80061 LIPID PANEL: CPT

## 2020-02-20 ASSESSMENT — PAIN DESCRIPTION - ORIENTATION
ORIENTATION: LEFT
ORIENTATION_2: LOWER

## 2020-02-20 ASSESSMENT — ENCOUNTER SYMPTOMS
PHOTOPHOBIA: 0
DIARRHEA: 1
BACK PAIN: 1
CONSTIPATION: 1
ALLERGIC/IMMUNOLOGIC NEGATIVE: 1
RESPIRATORY NEGATIVE: 1
EYES NEGATIVE: 1
EYE REDNESS: 0

## 2020-02-20 ASSESSMENT — PAIN DESCRIPTION - FREQUENCY: FREQUENCY: CONTINUOUS

## 2020-02-20 ASSESSMENT — PAIN DESCRIPTION - PROGRESSION
CLINICAL_PROGRESSION_2: GRADUALLY WORSENING
CLINICAL_PROGRESSION: GRADUALLY WORSENING

## 2020-02-20 ASSESSMENT — PAIN DESCRIPTION - PAIN TYPE
TYPE_2: CHRONIC PAIN
TYPE: CHRONIC PAIN

## 2020-02-20 ASSESSMENT — PAIN DESCRIPTION - LOCATION
LOCATION_2: BACK
LOCATION: ARM;SHOULDER;NECK

## 2020-02-20 ASSESSMENT — PAIN - FUNCTIONAL ASSESSMENT: PAIN_FUNCTIONAL_ASSESSMENT: PREVENTS OR INTERFERES SOME ACTIVE ACTIVITIES AND ADLS

## 2020-02-20 ASSESSMENT — PAIN DESCRIPTION - DIRECTION: RADIATING_TOWARDS_2: LEFT HIP AND LEG

## 2020-02-20 ASSESSMENT — PAIN SCALES - GENERAL: PAINLEVEL_OUTOF10: 3

## 2020-02-20 ASSESSMENT — PAIN DESCRIPTION - ONSET
ONSET_2: ON-GOING
ONSET: ON-GOING

## 2020-02-20 ASSESSMENT — PAIN DESCRIPTION - INTENSITY: RATING_2: 3

## 2020-02-20 NOTE — PROGRESS NOTES
[facility]  Patient Pain Assessment  Consultation - Dr. Kaveh Jacobson    Primary Care Physician: Martín Mccarty MD    Chief complaint:   Chief Complaint   Patient presents with    Neck Pain    Lower Back Pain   . HISTORY OF PRESENT ILLNESS:  Maira Retana is 78 y.o. female referred to the pain clinic in consultation for neck pain as well as low back pain by Chloé Kamara MD    Patient is an 67-year-old female with a history of low back pain as well as neck pain now for longstanding duration. The neck pain has been worse for the last 6 weeks or so. Patient reports she has history of osteoarthritis involving multiple joints. The pain in the cervical area radiates to the left shoulder and at times to the left arm. The pain in the low back radiates to both lower extremities. Patient had an knee and hip replacements in the past.  Patient reports she is on medication the name of which she is not sure of which is helping her pain. Back Pain   This is a chronic problem. The current episode started more than 1 month ago. The problem occurs constantly. The problem has been gradually worsening since onset. The pain is present in the lumbar spine and gluteal. The quality of the pain is described as aching. Radiates to: To both lower extremities. The pain is at a severity of 5/10 (3-10). The pain is moderate. The pain is the same all the time. The symptoms are aggravated by bending, position and twisting (Walking and lifting and ADLs). Associated symptoms include weakness. Pertinent negatives include no abdominal pain, chest pain, fever, headaches, numbness, paresis or tingling. Risk factors include lack of exercise, obesity and sedentary lifestyle. Neck Pain    This is a chronic problem. The current episode started more than 1 month ago. The problem occurs constantly. The problem has been gradually worsening. The pain is associated with nothing.  The pain is present in the left side and midline. The quality of the pain is described as aching and stabbing (sharp at times). The pain is at a severity of 5/10 (3-10). The pain is moderate. The symptoms are aggravated by twisting, bending and position (lt. shoulder movements). Associated symptoms include weakness. Pertinent negatives include no chest pain, fever, headaches, numbness, paresis, photophobia or tingling. Associated symptoms comments: Weakness in left arm. RX Monitoring 2/21/2020   Attestation -   Periodic Controlled Substance Monitoring Assessed functional status. Current Pain Assessment  Pain Assessment  Pain Assessment: 0-10  Pain Level: 3  Patient's Stated Pain Goal: 2  Pain Type: Chronic pain  Pain Location: Arm, Shoulder, Neck  Pain Orientation: Left  Pain Radiating Towards: left shulder and arm  Pain Descriptors: Aching, Constant, Dull, Nagging, Shooting  Pain Frequency: Continuous  Pain Onset: On-going  Clinical Progression: Gradually worsening  Functional Pain Assessment: Prevents or interferes some active activities and ADLs  Non-Pharmaceutical Pain Intervention(s): Rest, Repositioned, Heat applied  Multiple Pain Sites: Yes                    ADVERSE MEDICATION EFFECTS:   Constipation: yes  Bowel Regimen: Yes  Diet: common adult  Appetite:  ok  Sedation:  no  Urinary Retention: no    FOCUSED PAIN SCALE:  Highest : 10  Lowest :3  Average: Range-5  When and What  was your last procedure:      Was your procedureeffective:  not applicable    ACTIVITY/SOCIAL/EMOTIONAL:  Sleep Pattern: 4 hours per night. difficulty falling back asleep if awakened  Energy Level: Normal  Currently attending Physical Therapy:  No  Home Exercises: rarely no regular exercise  Mobility: limited transfer mobility   Do you use assistive devices?  Yes  Have you fallen in the last 30 days?:  Yes  Currently seeing a Psychiatristor Psychologist:  No  Emotional Issues: normal   Mood: appropriate     ABERRANT BEHAVIORS SINCE hernia repair with mesh    JOINT REPLACEMENT Bilateral     Hips    JOINT REPLACEMENT Bilateral     Knees    SHOULDER SURGERY Left     SINUS SURGERY      18 surgeries on sinuses    SPLENECTOMY      TONSILLECTOMY      TYMPANOSTOMY TUBE PLACEMENT Bilateral        Medications  Current Outpatient Medications   Medication Sig Dispense Refill    gabapentin (NEURONTIN) 300 MG capsule Take 1 capsule by mouth 3 times daily for 180 days. Intended supply: 30 days 90 capsule 5    tolterodine (DETROL LA) 2 MG extended release capsule Take 4 mg by mouth daily      simvastatin (ZOCOR) 40 MG tablet TAKE 1 TABLET DAILY 90 tablet 4    FLUoxetine (PROZAC) 40 MG capsule TAKE 1 CAPSULE DAILY 90 capsule 4    levothyroxine (SYNTHROID) 50 MCG tablet TAKE 1 TABLET DAILY 90 tablet 2    esomeprazole (NEXIUM) 20 MG delayed release capsule TAKE 1 CAPSULE DAILY 90 capsule 3    lisinopril-hydrochlorothiazide (PRINZIDE;ZESTORETIC) 20-12.5 MG per tablet TAKE 1 TABLET DAILY 90 tablet 3    ciclopirox (LOPROX) 0.77 % cream Apply topically 2 times daily. 30 g 0    polyethylene glycol (GLYCOLAX) powder Take 17 g by mouth daily Indications: Constipation       No current facility-administered medications for this encounter. Allergies  Doxycycline; Bactrim [sulfamethoxazole-trimethoprim]; Codeine; Penicillins; and Percocet [oxycodone-acetaminophen]    Family History  family history includes Hypertension in her father and mother; Other in her brother; Stroke in her father and mother. Social History  Social History     Socioeconomic History    Marital status:       Spouse name: None    Number of children: None    Years of education: None    Highest education level: None   Occupational History    Occupation: retired   Social Needs    Financial resource strain: None    Food insecurity:     Worry: None     Inability: None    Transportation needs:     Medical: None     Non-medical: None   Tobacco Use    Smoking status: Former Smoker     Packs/day: 1.00     Years: 40.00     Pack years: 40.00     Types: Cigarettes     Last attempt to quit: 4/3/1999     Years since quittin.9    Smokeless tobacco: Never Used   Substance and Sexual Activity    Alcohol use: Yes     Comment: socially     Drug use: No    Sexual activity: Never   Lifestyle    Physical activity:     Days per week: None     Minutes per session: None    Stress: None   Relationships    Social connections:     Talks on phone: None     Gets together: None     Attends Mosque service: None     Active member of club or organization: None     Attends meetings of clubs or organizations: None     Relationship status: None    Intimate partner violence:     Fear of current or ex partner: None     Emotionally abused: None     Physically abused: None     Forced sexual activity: None   Other Topics Concern    None   Social History Narrative    None      reports no history of drug use. REVIEW OF SYSTEMS:  Review of Systems   Constitutional: Negative. Negative for fever. HENT: Negative. Negative for hearing loss. Eyes: Negative. Negative for photophobia, redness and visual disturbance. Respiratory: Negative. Negative for cough and shortness of breath. Cardiovascular: Negative. Negative for chest pain and palpitations. Gastrointestinal: Positive for constipation, diarrhea and vomiting. Negative for abdominal pain. Endocrine: Negative. Negative for cold intolerance and polyuria. Genitourinary: Negative. Negative for dyspareunia and hematuria. Musculoskeletal: Positive for back pain and neck pain. Skin: Negative. Negative for color change and wound. Allergic/Immunologic: Negative. Neurological: Positive for weakness. Negative for tingling, numbness and headaches. Hematological: Negative. Psychiatric/Behavioral: Negative. Negative for dysphoric mood and suicidal ideas. The patient is not nervous/anxious.              GENERAL PHYSICAL Strength   The patient has normal right ankle strength. Left Ankle Exam     Muscle Strength   The patient has normal left ankle strength. Right Knee Exam     Muscle Strength   The patient has normal right knee strength. Left Knee Exam     Muscle Strength   The patient has normal left knee strength. Right Hip Exam     Muscle Strength   The patient has normal right hip strength. Left Hip Exam     Muscle Strength   The patient has normal left hip strength. Back Exam     Tenderness   The patient is experiencing tenderness in the lumbar, sacroiliac and cervical.    Range of Motion   Back extension: Limited and painful. Back flexion: Limited and painful. Back lateral bend right: Limited and painful. Back lateral bend left: Limited and painful. Back rotation right: Limited and painful. Back rotation left: Limited and painful. Other   Sensation: normal  Gait: antalgic   Scars: absent    Comments:  Skin:normal  Surgical scar : Absent---  Spinous process;  Cervical lordosis :absent   Spinal curves:   kyphosis absent and scoliosis absent  Thoracic spine:  kyphosis present and scoliosis absent  Alignment of the shoulder scapula and iliac crests: symmetric    Movements of the cervical spine as indicated above are: diminished range of motion   Facet loading---  : Right side--    Pain-Moderate                                   Left side----   Pain  Severe  Cervical traction test :   Right side---:  Negative   Left side-----:   Negative        Right Hand Exam     Muscle Strength   The patient has normal right wrist strength. Left Hand Exam     Muscle Strength   The patient has normal left wrist strength. Right Elbow Exam     Muscle Strength   The patient has normal right elbow strength. Left Elbow Exam     Muscle Strength   The patient has normal left elbow strength.       Left Shoulder Exam     Tenderness   The patient is experiencing tenderness in the acromioclavicular joint, acromion, clavicle and biceps tendon. Range of Motion   Left shoulder active abduction: Painful and restricted. Left shoulder passive abduction: Painful and restricted. Left shoulder extension: Painful and restricted. Left shoulder external rotation: Painful and restricted. Left shoulder forward flexion: Painful and restricted. Muscle Strength   Abduction: 4/5     Tests   Apprehension: positive  Virk test: positive  Impingement: positive  Drop arm: positive    Other   Erythema: absent  Scars: absent  Sensation: normal             Nurses Notes and Vital Signs reviewed. DATA  Labs:  Labs from [employer] was reviewed    Imaging:  Radiology Images and Reports reviewed where indicated and necessary  6/20/2018 2:31 pm       TECHNIQUE:   A bone density dual x-ray absorptiometry (DXA) scan was performed of the   lumbar spine and left foream on a [employer] system.       COMPARISON:   None.       HISTORY:   ORDERING SYSTEM PROVIDED HISTORY: Abnormal findings on diagnostic imaging of   limbs   TECHNOLOGIST PROVIDED HISTORY:   Reason for exam:->screen       FINDINGS:   LUMBAR SPINE:       The bone mineral density in the lumbar spine including the L1-L4 levels is   measured at 1.408 g/cm2, which corresponds to a T-score of +1.7 and a Z-score   of +2.4.  This is within the normal range by WHO criteria.       LEFT FOREARM:       The bone mineral density of the left 1/3 radius is measured at 0.654 g/cm2   corresponding to a T-score of -0.8 and a Z-score of +1.9.  This is within   normal range by [facility] criteria.           Impression   Normal bone mineral density by WHO criteria. FULL RESULT:   MRI LUMBAR WITHOUT IV CONTRAST       INDICATION:    Left hip and groin pain.       COMPARISON:    No priors available.       FINDINGS:   Multiplanar multisequence imaging performed without IV contrast.       There is normal lumbar lordosis.  Bone marrow signal is normal. Spinal    cord terminates at seen.    Impression:    No acute findings. Finalized by Bubba Tapia MD on 3/31/2017     CT PELVIS ATTENTION HIP    COMPARISON: 12/14/2016    HISTORY: Left hip pain. Difficulty ambulating. Prior hip replacement 2004    TECHNIQUE: Axial images of the pelvis with attention given to the hips were obtained. Subsequently, sagittal and coronal 2-D reformatted images.  Automated exposure control was used. Special algorithms were used to reduce a streak artifact from the   arthroplasty    FINDINGS:    No evidence of proximal left femoral fracture. No evidence of loosening. The prosthetic stem appears well-positioned. Streak artifact hampers assessment of the adjacent acetabulum. No obvious disruption of the acetabulum. Anterior and posterior columns   appear intact. Pubic rami are intact. Portion of the visualized sacrum appears intact. The left iliac bone is intact. Atherosclerotic disease is noted. IMPRESSION:    *  No evidence of fracture or loosening. Chronic findings are detailed above    Finalized by Mahad London MD on 12/30/2016            Impression:              1. There is high-grade bursal tear with a delaminating component of        the supraspinatus tendon. No clear evidence of full-thickness        extension.       2. Supraspinatus and infraspinatus tendinosis. There is also mild        subscapularis tendinosis.      3. Laterally downsloping acromion and severe degenerative changes of        the acromioclavicular joint which may relate to the clinical        diagnosis of impingement.       4.  Degenerative changes.              TRANSCRIBED BY: SPEECH RECOGNITION 05/26/2016 at 11:57       DICTATED BY:  SADA LORA MD  05/26/2016     Patient Active Problem List   Diagnosis    Complete rotator cuff tear    Constipation    Depression with anxiety    Esophageal reflux    Fatigue    Hyperlipidemia    Hypertension    Impingement syndrome of right shoulder    Joint pain, knee    Lumbar explained to patient. including \" Water supports the body and minimizes the effect of gravity, making it easier for patients to start an exercise program.\"   The following important principles were discussed with patient:  1. Limit Bed Rest--Studies show that people with short-term low-back pain who rest feel more pain and have a harder time with daily tasks than those who stay active. 2. Keep Exercising-patient is advised to stay away from strenuous activities like gardening and avoid whatever motion caused the pain in the first place.   3. Maintain Good Posture-Exercises  to maintain good posture were shown to patient. 4. To do exercises learned in PT regularly. []Information (handout) on exercise was  given to patient. Patient appears to have pain mostly originating in the left shoulder area. She also has degenerative cervical disc disease and cervical spondylosis and facet joint pain as well as radicular pain. Initially we will try left shoulder joint injection to see if that could help the pain. Depending on the response to the procedure we will plan further treatments. Discussed treatment plan along with the procedures and the risks were discussed with the patient. Also alternate therapies were discussed. Patient would like to proceed with the left shoulder joint injection. And so she will be scheduled for one in the near future. I also discussed with her regarding the importance of exercise core strengthening as well as diet and weight loss.     Orders Placed This Encounter   Procedures    XR CERVICAL SPINE (4-5 VIEWS)     Standing Status:   Future     Number of Occurrences:   1     Standing Expiration Date:   2/20/2021   [facility]     Standing Status:   Future     Standing Expiration Date:   2/20/2021    PT eval and treat     TBD by Pain Clinic MD     Standing Status:   Future     Standing Expiration Date:   2/19/2021    PT aquatic therapy     TBD by Pain Clinic MD

## 2020-02-21 ASSESSMENT — ENCOUNTER SYMPTOMS
COUGH: 0
COLOR CHANGE: 0
SHORTNESS OF BREATH: 0
VOMITING: 1
ABDOMINAL PAIN: 0

## 2020-02-24 ENCOUNTER — HOSPITAL ENCOUNTER (OUTPATIENT)
Dept: GENERAL RADIOLOGY | Age: 80
Discharge: HOME | End: 2020-02-26
Payer: MEDICARE

## 2020-02-24 ENCOUNTER — HOSPITAL ENCOUNTER (OUTPATIENT)
Dept: PAIN MANAGEMENT | Age: 80
Discharge: HOME | End: 2020-02-24
Payer: MEDICARE

## 2020-02-24 VITALS
OXYGEN SATURATION: 95 % | SYSTOLIC BLOOD PRESSURE: 142 MMHG | HEIGHT: 62 IN | DIASTOLIC BLOOD PRESSURE: 75 MMHG | BODY MASS INDEX: 46.01 KG/M2 | RESPIRATION RATE: 18 BRPM | WEIGHT: 250 LBS | HEART RATE: 72 BPM

## 2020-02-24 PROCEDURE — 20610 DRAIN/INJ JOINT/BURSA W/O US: CPT | Performed by: PAIN MEDICINE

## 2020-02-24 PROCEDURE — 3209999900 FLUORO FOR SURGICAL PROCEDURES

## 2020-02-24 PROCEDURE — 6360000004 HC RX CONTRAST MEDICATION: Performed by: PAIN MEDICINE

## 2020-02-24 PROCEDURE — 20610 DRAIN/INJ JOINT/BURSA W/O US: CPT

## 2020-02-24 PROCEDURE — 77002 NEEDLE LOCALIZATION BY XRAY: CPT

## 2020-02-24 PROCEDURE — 6360000002 HC RX W HCPCS: Performed by: PAIN MEDICINE

## 2020-02-24 PROCEDURE — 2500000003 HC RX 250 WO HCPCS: Performed by: PAIN MEDICINE

## 2020-02-24 PROCEDURE — 77002 NEEDLE LOCALIZATION BY XRAY: CPT | Performed by: PAIN MEDICINE

## 2020-02-24 RX ORDER — BUPIVACAINE HYDROCHLORIDE 5 MG/ML
INJECTION, SOLUTION EPIDURAL; INTRACAUDAL
Status: COMPLETED | OUTPATIENT
Start: 2020-02-24 | End: 2020-02-24

## 2020-02-24 RX ORDER — BETAMETHASONE SODIUM PHOSPHATE AND BETAMETHASONE ACETATE 3; 3 MG/ML; MG/ML
INJECTION, SUSPENSION INTRA-ARTICULAR; INTRALESIONAL; INTRAMUSCULAR; SOFT TISSUE
Status: COMPLETED | OUTPATIENT
Start: 2020-02-24 | End: 2020-02-24

## 2020-02-24 RX ADMIN — BETAMETHASONE SODIUM PHOSPHATE AND BETAMETHASONE ACETATE 15 MG: 3; 3 INJECTION, SUSPENSION INTRA-ARTICULAR; INTRALESIONAL; INTRAMUSCULAR at 08:59

## 2020-02-24 RX ADMIN — BUPIVACAINE HYDROCHLORIDE 10 ML: 5 INJECTION, SOLUTION EPIDURAL; INTRACAUDAL; PERINEURAL at 08:57

## 2020-02-24 RX ADMIN — IOHEXOL 2 ML: 180 INJECTION INTRAVENOUS at 09:00

## 2020-02-24 ASSESSMENT — PAIN DESCRIPTION - ORIENTATION: ORIENTATION: LEFT

## 2020-02-24 ASSESSMENT — PAIN DESCRIPTION - ONSET: ONSET: ON-GOING

## 2020-02-24 ASSESSMENT — PAIN DESCRIPTION - PROGRESSION: CLINICAL_PROGRESSION: GRADUALLY WORSENING

## 2020-02-24 ASSESSMENT — PAIN - FUNCTIONAL ASSESSMENT
PAIN_FUNCTIONAL_ASSESSMENT: 0-10
PAIN_FUNCTIONAL_ASSESSMENT: PREVENTS OR INTERFERES SOME ACTIVE ACTIVITIES AND ADLS

## 2020-02-24 ASSESSMENT — PAIN SCALES - GENERAL: PAINLEVEL_OUTOF10: 4

## 2020-02-24 ASSESSMENT — PAIN DESCRIPTION - LOCATION: LOCATION: SHOULDER

## 2020-02-24 ASSESSMENT — PAIN DESCRIPTION - FREQUENCY: FREQUENCY: CONTINUOUS

## 2020-02-24 ASSESSMENT — PAIN DESCRIPTION - PAIN TYPE: TYPE: CHRONIC PAIN

## 2020-02-24 NOTE — PROCEDURES
systemic disease that limits activity but is not incapacitating    Sedation/ Anesthesia Plan:   intravenous sedation    as needed. Medications Planned:   midazolam (Versed) / Fentanyl  Intravenously  as needed. Patient is an appropriate candidate for plan of sedation: yes  Patient's History and Physical examination was reviewed and there is no change. Electronically signed by Rolando Soler MD on 2/24/2020 at 9:00 AM      Preoperative Diagnosis:    1. Impingement syndrome of right shoulder    2. Nontraumatic complete tear of left rotator cuff    3. Bursitis of left shoulder        Postoperative Diagnosis:    1. Impingement syndrome of right shoulder    2. Nontraumatic complete tear of left rotator cuff    3. Bursitis of left shoulder        Procedure Performed:  left shoulder joint injection with injection of the subacromial bursa under fluoroscopy guidance. Indication for the Procedure: The patient is having pain in left shoulder not responding to conservative management because of severe pain in the shoulder. Physical exam demonstrated tenderness over the left glenohumeral joint and subacromial bursa with limited range of motion of the shoulder. Due to failure of conservative management, we decided to perform a left glenohumeral joint and subacromial bursa injection.  The risks, benefits and alternatives were explained, all questions were answered, and written informed consent was obtained   Current Pain Assessment  Pain Assessment  Pain Assessment: 0-10  Pain Level: 4  Patient's Stated Pain Goal: 2  Pain Type: Chronic pain  Pain Location: Shoulder  Pain Orientation: Left  Pain Radiating Towards: leftg shoulder and arm  Pain Descriptors: Aching, Constant, Dull, Nagging, Shooting  Pain Frequency: Continuous  Pain Onset: On-going  Clinical Progression: Gradually worsening  Functional Pain Assessment: Prevents or interferes some active activities and ADLs  Non-Pharmaceutical Pain Intervention(s):

## 2020-02-24 NOTE — PROGRESS NOTES
Discharge criteria met. Patient alert and oriented x3  Post procedure dressing dry and intact. Sensory and motor function intact as per pre-procedure. Instructions and follow up reviewed with pt at patient at discharge. Patient discharged ambulatory @ 9:15 with Jessica Maldonado to drive pt home and care for her today. Steady gait.

## 2020-02-25 ENCOUNTER — TELEPHONE (OUTPATIENT)
Dept: PAIN MANAGEMENT | Age: 80
End: 2020-02-25

## 2020-03-09 ENCOUNTER — HOSPITAL ENCOUNTER (OUTPATIENT)
Dept: PAIN MANAGEMENT | Age: 80
Discharge: HOME | End: 2020-03-09
Payer: MEDICARE

## 2020-03-09 VITALS
OXYGEN SATURATION: 97 % | HEIGHT: 62 IN | TEMPERATURE: 98.1 F | HEART RATE: 74 BPM | WEIGHT: 250 LBS | RESPIRATION RATE: 16 BRPM | BODY MASS INDEX: 46.01 KG/M2

## 2020-03-09 PROCEDURE — 99213 OFFICE O/P EST LOW 20 MIN: CPT

## 2020-03-09 PROCEDURE — 99214 OFFICE O/P EST MOD 30 MIN: CPT | Performed by: PAIN MEDICINE

## 2020-03-09 ASSESSMENT — PAIN DESCRIPTION - PROGRESSION
CLINICAL_PROGRESSION: RESOLVED
CLINICAL_PROGRESSION: GRADUALLY WORSENING

## 2020-03-09 ASSESSMENT — PAIN DESCRIPTION - PAIN TYPE: TYPE: CHRONIC PAIN

## 2020-03-09 ASSESSMENT — PAIN DESCRIPTION - ONSET: ONSET: ON-GOING

## 2020-03-09 ASSESSMENT — ENCOUNTER SYMPTOMS
GASTROINTESTINAL NEGATIVE: 1
ALLERGIC/IMMUNOLOGIC NEGATIVE: 1
BACK PAIN: 1
RESPIRATORY NEGATIVE: 1

## 2020-03-09 ASSESSMENT — PAIN DESCRIPTION - DIRECTION: RADIATING_TOWARDS: NO PAIN

## 2020-03-09 ASSESSMENT — PAIN DESCRIPTION - DESCRIPTORS: DESCRIPTORS: CRAMPING;NUMBNESS

## 2020-03-09 ASSESSMENT — PAIN SCALES - GENERAL: PAINLEVEL_OUTOF10: 1

## 2020-03-09 ASSESSMENT — PAIN DESCRIPTION - LOCATION
LOCATION: BACK
LOCATION: SHOULDER

## 2020-03-09 ASSESSMENT — PAIN DESCRIPTION - ORIENTATION
ORIENTATION: RIGHT;LEFT
ORIENTATION: LEFT

## 2020-03-09 NOTE — PROGRESS NOTES
tablet 3    ciclopirox (LOPROX) 0.77 % cream Apply topically 2 times daily. 30 g 0    polyethylene glycol (GLYCOLAX) powder Take 17 g by mouth daily Indications: Constipation       No current facility-administered medications for this encounter. Allergies  Doxycycline; Bactrim [sulfamethoxazole-trimethoprim]; Codeine; Penicillins; and Percocet [oxycodone-acetaminophen]    Family History  family history includes Hypertension in her father and mother; Other in her brother; Stroke in her father and mother. Social History  Social History     Socioeconomic History    Marital status:      Spouse name: None    Number of children: None    Years of education: None    Highest education level: None   Occupational History    Occupation: retired   Social Needs    Financial resource strain: None    Food insecurity     Worry: None     Inability: None    Transportation needs     Medical: None     Non-medical: None   Tobacco Use    Smoking status: Former Smoker     Packs/day: 1.00     Years: 40.00     Pack years: 40.00     Types: Cigarettes     Last attempt to quit: 4/3/1999     Years since quittin.9    Smokeless tobacco: Never Used   Substance and Sexual Activity    Alcohol use: Yes     Comment: socially     Drug use: No    Sexual activity: Never   Lifestyle    Physical activity     Days per week: None     Minutes per session: None    Stress: None   Relationships    Social connections     Talks on phone: None     Gets together: None     Attends [employer] service: None     Active member of club or organization: None     Attends meetings of clubs or organizations: None     Relationship status: None    Intimate partner violence     Fear of current or ex partner: None     Emotionally abused: None     Physically abused: None     Forced sexual activity: None   Other Topics Concern    None   Social History Narrative    None      reports no history of drug use.         REVIEW OF SYSTEMS:  Review of Systems   Constitutional: Positive for fatigue. HENT: Negative. Eyes:        Wears glasses   Respiratory: Negative. Cardiovascular:        Hypertension   Gastrointestinal: Negative. Endocrine: Negative. Genitourinary: Positive for frequency and urgency. Musculoskeletal: Positive for back pain and gait problem. Left Shoulder pain resolved   Skin: Negative. Allergic/Immunologic: Negative. Neurological: Positive for weakness and numbness. Left leg numbness   Hematological: Negative. Psychiatric/Behavioral: Negative. GENERAL PHYSICAL EXAM:  Vitals: Pulse 74   Temp 98.1 °F (36.7 °C) (Oral)   Resp 16   Ht 5' 2\" (1.575 m)   Wt 250 lb (113.4 kg)   SpO2 97%   BMI 45.73 kg/m² , Body mass index is 45.73 kg/m². Physical Exam  Constitutional:       Appearance: Normal appearance. She is well-developed. She is obese. HENT:      Head: Normocephalic and atraumatic. Nose: Nose normal.      Mouth/Throat:      Mouth: Mucous membranes are moist.   Eyes:      Conjunctiva/sclera: Conjunctivae normal.      Pupils: Pupils are equal, round, and reactive to light. Neck:      Musculoskeletal: Normal range of motion and neck supple. No neck rigidity or muscular tenderness. Thyroid: No thyromegaly. Trachea: No tracheal deviation. Cardiovascular:      Rate and Rhythm: Normal rate and regular rhythm. Pulses: Normal pulses. Heart sounds: Normal heart sounds. Pulmonary:      Effort: Pulmonary effort is normal. No respiratory distress. Abdominal:      General: There is no distension. Tenderness: There is no abdominal tenderness. Musculoskeletal:      Right lower leg: No edema. Left lower leg: No edema. Skin:     General: Skin is warm and dry. Neurological:      General: No focal deficit present. Mental Status: She is alert and oriented to person, place, and time. Cranial Nerves: Cranial nerves are intact.  No cranial nerve Posture-Exercises  to maintain good posture were shown to patient. 4. To do exercises learned in PT regularly. [x]Information (handout) on exercise was  given to patient. The following treatment plan was developed after discussion with patient:    We discussed Lumbar Epidural steroid Injections x 1  at [facility]. Patient tried and failed NSAIDS,Home exercises, Physical Therapy, Chiropractic manipulations without relief. Patient exhibited signs of radiculopathy with positive straight leg raising test on bilaterally    Patient has not had prior Lumbar Surgery. We will see the patient in 2 weeks after the procedures and re-evaluate symptoms. Orders Placed This Encounter   Procedures    Lumbar Epidural Steroid Injection/Caudal     Standing Status:   Future     Standing Expiration Date:   3/9/2021   [facility] For [facility] Procedures     Standing Status:   Future     Standing Expiration Date:   3/9/2021    Saline lock IV     Standing Status:   Future     Standing Expiration Date:   9/9/2021        Decision Making Process : Patient's health history and referral records thoroughly reviewed before focused physical examination and discussion with patient. Over 50% of today's visit is spent on examining the patient and counseling. Level of complexity of date to be reviewed is Moderate. The chart date reviewed include the following: Imaging Reports. Summary of Care. Time spent reviewing with patient the below reports:   Medication safety, Treatment options. Level of diagnosis and management options of this case is multiple: involving the following management options: Interventions as needed, medication management as appropriate, future visits, activity modification, heat/ice as needed, Urine drug screen as required. [x]The patient's questions were answered to the best of my abilities. This note was created using voice recognition software.  There may be inaccuracies of transcription  that are inadvertently overlooked prior to the signature. There is any questions about the transcription please contact me.     Electronically signed by Cliff Nair MD on 3/9/2020 at 4:28 PM                 Electronically signed by Cliff Nair MD on 3/9/2020 at 4:28 PM

## 2020-03-20 RX ORDER — LEVOTHYROXINE SODIUM 0.05 MG/1
TABLET ORAL
Qty: 90 TABLET | Refills: 3 | Status: SHIPPED | OUTPATIENT
Start: 2020-03-20 | End: 2021-03-15

## 2020-04-02 RX ORDER — FUROSEMIDE 20 MG/1
TABLET ORAL
Qty: 90 TABLET | Refills: 3 | Status: SHIPPED | OUTPATIENT
Start: 2020-04-02 | End: 2020-07-24

## 2020-04-17 ENCOUNTER — TELEPHONE (OUTPATIENT)
Dept: PRIMARY CARE CLINIC | Age: 80
End: 2020-04-17

## 2020-05-26 ENCOUNTER — OFFICE VISIT (OUTPATIENT)
Dept: ORTHOPEDIC SURGERY | Age: 80
End: 2020-05-26
Payer: MEDICARE

## 2020-05-26 VITALS — HEIGHT: 62 IN | WEIGHT: 240 LBS | BODY MASS INDEX: 44.16 KG/M2

## 2020-05-26 PROCEDURE — 1090F PRES/ABSN URINE INCON ASSESS: CPT | Performed by: ORTHOPAEDIC SURGERY

## 2020-05-26 PROCEDURE — G8399 PT W/DXA RESULTS DOCUMENT: HCPCS | Performed by: ORTHOPAEDIC SURGERY

## 2020-05-26 PROCEDURE — 1036F TOBACCO NON-USER: CPT | Performed by: ORTHOPAEDIC SURGERY

## 2020-05-26 PROCEDURE — 1123F ACP DISCUSS/DSCN MKR DOCD: CPT | Performed by: ORTHOPAEDIC SURGERY

## 2020-05-26 PROCEDURE — 4040F PNEUMOC VAC/ADMIN/RCVD: CPT | Performed by: ORTHOPAEDIC SURGERY

## 2020-05-26 PROCEDURE — 99203 OFFICE O/P NEW LOW 30 MIN: CPT | Performed by: ORTHOPAEDIC SURGERY

## 2020-05-26 PROCEDURE — G8417 CALC BMI ABV UP PARAM F/U: HCPCS | Performed by: ORTHOPAEDIC SURGERY

## 2020-05-26 PROCEDURE — G8427 DOCREV CUR MEDS BY ELIG CLIN: HCPCS | Performed by: ORTHOPAEDIC SURGERY

## 2020-05-26 NOTE — PROGRESS NOTES
Orthopedic Shoulder Encounter Note     Chief complaint: Left shoulder pain    HPI: Akanksha Rodriguez is a 78 y.o.  right-hand dominant female who presents for evaluation of her left shoulder. She has been having pain now for about 3 months. She denies any precipitating trauma or injury. Of note she does have a history of a rotator cuff repair in the shoulder performed in 2014. Her pain at this time is primarily localized to the anterior aspect of her shoulder. It was quite bad in February and at that time she received a cortisone injection from Dr. Galen Macias which she states was highly effective in providing pain relief. At this time she describes her pain is been an intermittent ache. She denies having any weakness but does report some stiffness in her shoulder. Her pain does not radiate at this point and is not associated with any numbness or tingling. Previous treatment:    NSAIDs: None    Physical Therapy:  HEP    Injections: Left shoulder subacromial cortisone injection by Dr. Galen Macias on 2/24/2020    Surgeries: Bilateral shoulder rotator cuff repair in 2014 by Dr. Melecio Broussard MD    Review of Systems:     Constitution: no fever or chills   Pain level: 2/10  Musculoskeletal: As noted in the HPI   Neurologic: no neurologic symptoms    Past Medical History  Naman La  has a past medical history of Acute sinusitis, Anxiety, Arthritis, Attic perforation of tympanic membrane, right ear, Colitis, Cramp of limb, Depression, Difficulty breathing, Diverticulosis, Elbow injury, GERD (gastroesophageal reflux disease), H/O acne, H/O acute bronchitis, H/O breast lump, H/O Clostridium difficile infection, H/O diarrhea, H/O diverticulitis of colon, H/O seborrheic keratosis, H/O: hypothyroidism, Hyperlipidemia, Hypertension, Hypothyroid, Joint pain, hip, OA (osteoarthritis), Obesity, Polyuria, Shoulder injury, Urine, incontinence, stress female, Varicose vein, and Vertigo.     Past Surgical History  Naman La  has a past surgical history that includes sinus surgery; Splenectomy; Esophagus surgery; Foot surgery (Bilateral); Cataract removal with implant (Bilateral); Tonsillectomy; Dilation and curettage of uterus; Tympanostomy tube placement (Bilateral); Abdomen surgery; Breast lumpectomy (Left); fracture surgery; hernia repair; Appendectomy; Carpal tunnel release (Bilateral); joint replacement (Bilateral); joint replacement (Bilateral); shoulder surgery (Left); Endoscopy, colon, diagnostic; Endoscopy, colon, diagnostic (4-29-14); Colonoscopy; Colonoscopy (4-29-14); Incisional hernia repair (5/15/14); Breast lumpectomy (Left, 5/15/14); and Cholecystectomy. Current Medications  Current Outpatient Medications   Medication Sig Dispense Refill    lisinopril-hydroCHLOROthiazide (PRINZIDE;ZESTORETIC) 20-12.5 MG per tablet TAKE 1 TABLET DAILY 90 tablet 0    furosemide (LASIX) 20 MG tablet TAKE 1 TABLET DAILY 90 tablet 3    levothyroxine (SYNTHROID) 50 MCG tablet TAKE 1 TABLET DAILY 90 tablet 3    gabapentin (NEURONTIN) 300 MG capsule Take 1 capsule by mouth 3 times daily for 180 days. Intended supply: 30 days 90 capsule 5    tolterodine (DETROL LA) 2 MG extended release capsule Take 4 mg by mouth daily      simvastatin (ZOCOR) 40 MG tablet TAKE 1 TABLET DAILY 90 tablet 4    FLUoxetine (PROZAC) 40 MG capsule TAKE 1 CAPSULE DAILY 90 capsule 4    esomeprazole (NEXIUM) 20 MG delayed release capsule TAKE 1 CAPSULE DAILY 90 capsule 3    ciclopirox (LOPROX) 0.77 % cream Apply topically 2 times daily. 30 g 0    polyethylene glycol (GLYCOLAX) powder Take 17 g by mouth daily Indications: Constipation       No current facility-administered medications for this visit. Allergies  Allergies have been reviewed. Stephanie is allergic to doxycycline; bactrim [sulfamethoxazole-trimethoprim]; codeine; penicillins; and percocet [oxycodone-acetaminophen]. Social History  Stephanie  reports that she quit smoking about 21 years ago.  Her

## 2020-07-24 ENCOUNTER — OFFICE VISIT (OUTPATIENT)
Dept: PRIMARY CARE CLINIC | Age: 80
End: 2020-07-24
Payer: MEDICARE

## 2020-07-24 VITALS
TEMPERATURE: 97.2 F | DIASTOLIC BLOOD PRESSURE: 82 MMHG | HEART RATE: 80 BPM | SYSTOLIC BLOOD PRESSURE: 124 MMHG | WEIGHT: 239 LBS | OXYGEN SATURATION: 97 % | BODY MASS INDEX: 43.71 KG/M2

## 2020-07-24 LAB
AMPHETAMINE SCREEN, URINE: NORMAL
BARBITURATE SCREEN, URINE: NORMAL
BENZODIAZEPINE SCREEN, URINE: NORMAL
BUPRENORPHINE URINE: NORMAL
COCAINE METABOLITE SCREEN URINE: NORMAL
GABAPENTIN SCREEN, URINE: NORMAL
MDMA URINE: NORMAL
METHADONE SCREEN, URINE: NORMAL
METHAMPHETAMINE, URINE: NORMAL
OPIATE SCREEN URINE: NORMAL
OXYCODONE SCREEN URINE: NORMAL
PHENCYCLIDINE SCREEN URINE: NORMAL
PROPOXYPHENE SCREEN, URINE: NORMAL
THC SCREEN, URINE: NORMAL
TRICYCLIC ANTIDEPRESSANTS, UR: NORMAL

## 2020-07-24 PROCEDURE — 1090F PRES/ABSN URINE INCON ASSESS: CPT | Performed by: NURSE PRACTITIONER

## 2020-07-24 PROCEDURE — G8417 CALC BMI ABV UP PARAM F/U: HCPCS | Performed by: NURSE PRACTITIONER

## 2020-07-24 PROCEDURE — 1036F TOBACCO NON-USER: CPT | Performed by: NURSE PRACTITIONER

## 2020-07-24 PROCEDURE — G8399 PT W/DXA RESULTS DOCUMENT: HCPCS | Performed by: NURSE PRACTITIONER

## 2020-07-24 PROCEDURE — G8427 DOCREV CUR MEDS BY ELIG CLIN: HCPCS | Performed by: NURSE PRACTITIONER

## 2020-07-24 PROCEDURE — 1123F ACP DISCUSS/DSCN MKR DOCD: CPT | Performed by: NURSE PRACTITIONER

## 2020-07-24 PROCEDURE — 4040F PNEUMOC VAC/ADMIN/RCVD: CPT | Performed by: NURSE PRACTITIONER

## 2020-07-24 PROCEDURE — 99214 OFFICE O/P EST MOD 30 MIN: CPT | Performed by: NURSE PRACTITIONER

## 2020-07-24 PROCEDURE — 80305 DRUG TEST PRSMV DIR OPT OBS: CPT | Performed by: NURSE PRACTITIONER

## 2020-07-24 RX ORDER — TRAMADOL HYDROCHLORIDE 50 MG/1
50 TABLET ORAL EVERY 8 HOURS PRN
Qty: 20 TABLET | Refills: 0 | Status: SHIPPED | OUTPATIENT
Start: 2020-07-24 | End: 2020-07-31

## 2020-07-24 ASSESSMENT — ENCOUNTER SYMPTOMS
EYE REDNESS: 0
WHEEZING: 0
SHORTNESS OF BREATH: 0
NAUSEA: 0
COUGH: 0
EYE PAIN: 0
BACK PAIN: 1
CONSTIPATION: 0
DIARRHEA: 0

## 2020-07-24 NOTE — PATIENT INSTRUCTIONS
X-ray of right shoulder and right hip  Declined physical therapy  Apply ice to shoulder and hip 3x/day  Tramadol 50 mg as needed up to 3x/day

## 2020-07-24 NOTE — PROGRESS NOTES
[facility]  [address] [place] [id number]  Dept: 235 Donnell Zarate is a 78 y.o. female who presents today for her medical conditions/complaintsas noted below. Chief Complaint   Patient presents with    Fall     Pt was standing and fell - hit the wall behind her on the Right side of her body. Hit the Right side of her head. Fall was 3 weeks ago.  Pain     Shoulder, hip and knee all on Right side. Has some pain on the back of her neck and the side of her head still. HPI:     HPI  She was standing on back porch and just went back. She hit right side of body, back of head and shoulder on wall. She is still having pain from fall. If she sits wrong way on hip it is intense pain. No muscle spasms  NO lightheadedness or dizziness. She does not know why she fell    She saw Dr. Chao Simental for left shoulder and was given shot and has not had pain since. She also saw Dr. Gagan Bocanegra who said she may need operation.       LDL Cholesterol (mg/dL)   Date Value   02/20/2020 97   04/27/2012 92       (goal LDL is <100)   AST (U/L)   Date Value   02/10/2014 26     ALT (U/L)   Date Value   02/10/2014 31     BUN (mg/dL)   Date Value   02/20/2020 30 (H)     BP Readings from Last 3 Encounters:   07/24/20 124/82   02/24/20 (!) 142/75   02/20/20 104/72          (goal 120/80)    Past Medical History:   Diagnosis Date    Acute sinusitis     Anxiety     Arthritis     Attic perforation of tympanic membrane, right ear     Colitis     Cramp of limb     Depression     Difficulty breathing     Diverticulosis     Elbow injury     GERD (gastroesophageal reflux disease)     H/O acne     H/O acute bronchitis     H/O breast lump     H/O Clostridium difficile infection     H/O diarrhea     H/O diverticulitis of colon     H/O seborrheic keratosis     H/O: hypothyroidism     Hyperlipidemia     Hypertension     Hypothyroid     Joint pain, hip     OA (osteoarthritis)     Obesity     Polyuria     Shoulder injury     Urine, incontinence, stress female     Varicose vein     Vertigo       Past Surgical History:   Procedure Laterality Date    ABDOMEN SURGERY      laparotomy    APPENDECTOMY      BREAST LUMPECTOMY Left     BREAST LUMPECTOMY Left 5/15/14    CARPAL TUNNEL RELEASE Bilateral     CATARACT REMOVAL WITH IMPLANT Bilateral     CHOLECYSTECTOMY      COLONOSCOPY      COLONOSCOPY  14    diverticulosis, int. hemorroids    DILATION AND CURETTAGE OF UTERUS      ENDOSCOPY, COLON, DIAGNOSTIC      ENDOSCOPY, COLON, DIAGNOSTIC  14    polyps removed, Bx    ESOPHAGUS SURGERY      FOOT SURGERY Bilateral     bunion removal     FRACTURE SURGERY      nasal    HERNIA REPAIR      Hiatal    INCISIONAL HERNIA REPAIR  5/15/14    with periumbilical hernia repair with mesh    JOINT REPLACEMENT Bilateral     Hips    JOINT REPLACEMENT Bilateral     Knees    SHOULDER SURGERY Left     SINUS SURGERY      18 surgeries on sinuses    SPLENECTOMY      TONSILLECTOMY      TYMPANOSTOMY TUBE PLACEMENT Bilateral        Family History   Problem Relation Age of Onset    Hypertension Mother         MI,CAD    Stroke Mother     Hypertension Father         MI, CAD   [facility] Stroke Father     Other Brother         CAD       Social History     Tobacco Use    Smoking status: Former Smoker     Packs/day: 1.00     Years: 40.00     Pack years: 40.00     Types: Cigarettes     Last attempt to quit: 4/3/1999     Years since quittin.3    Smokeless tobacco: Never Used   Substance Use Topics    Alcohol use: Yes     Comment: socially       Current Outpatient Medications   Medication Sig Dispense Refill    traMADol (ULTRAM) 50 MG tablet Take 1 tablet by mouth every 8 hours as needed for Pain for up to 7 days.  20 tablet 0    esomeprazole (NEXIUM) 20 MG delayed release capsule TAKE 1 CAPSULE DAILY 90 capsule 3    lisinopril-hydroCHLOROthiazide (PRINZIDE;ZESTORETIC) 20-12.5 MG per tablet TAKE 1 TABLET DAILY 90 tablet 0    levothyroxine (SYNTHROID) 50 MCG tablet TAKE 1 TABLET DAILY 90 tablet 3    tolterodine (DETROL LA) 2 MG extended release capsule Take 4 mg by mouth daily Indications: Pt states it is no longer working - she is still taking it.  simvastatin (ZOCOR) 40 MG tablet TAKE 1 TABLET DAILY 90 tablet 4    FLUoxetine (PROZAC) 40 MG capsule TAKE 1 CAPSULE DAILY 90 capsule 4     No current facility-administered medications for this visit. Allergies   Allergen Reactions    Doxycycline Other (See Comments)     dizzyness    Bactrim [Sulfamethoxazole-Trimethoprim]     Codeine     Penicillins     Percocet [Oxycodone-Acetaminophen]        Health Maintenance   Topic Date Due    Meningococcal (ACWY) vaccine (1 - Risk start before 7 months 4-dose series) 1938    Hib vaccine (1 of 1 - Risk 1-dose series) 11/19/1939    Meningococcal B vaccine (1 of 4 - Increased Risk Bexsero 2-dose series) 08/19/1948    DTaP/Tdap/Td vaccine (1 - Tdap) 08/19/1957    Shingles Vaccine (1 of 2) 08/19/1988    Annual Wellness Visit (AWV)  06/23/2019    Flu vaccine (1) 09/01/2020    Lipid screen  02/20/2021    Potassium monitoring  02/20/2021    Creatinine monitoring  02/20/2021    DEXA (modify frequency per FRAX score)  Completed    Pneumococcal 65+ yrs at Risk Vaccine  Completed    Hepatitis A vaccine  Aged Out    Hepatitis B vaccine  Aged Out       Subjective:      Review of Systems   Constitutional: Negative for chills, fatigue and fever. HENT: Negative for congestion, ear pain and nosebleeds. Eyes: Negative for pain and redness. Respiratory: Negative for cough, shortness of breath and wheezing. Cardiovascular: Negative for chest pain, palpitations and leg swelling. Gastrointestinal: Negative for constipation, diarrhea and nausea. Genitourinary: Negative for difficulty urinating and dysuria.    Musculoskeletal: Positive for back pain, gait problem and neck pain. Skin: Negative for rash. Neurological: Negative for dizziness, light-headedness and headaches. Psychiatric/Behavioral: Negative for dysphoric mood and sleep disturbance. The patient is not nervous/anxious. Objective:     /82   Pulse 80   Temp 97.2 °F (36.2 °C)   Wt 239 lb (108.4 kg)   SpO2 97%   BMI 43.71 kg/m²   Physical Exam  Vitals signs and nursing note reviewed. Constitutional:       Appearance: She is obese. HENT:      Head: Normocephalic and atraumatic. Right Ear: External ear normal.      Left Ear: External ear normal.   Eyes:      Extraocular Movements: Extraocular movements intact. Pupils: Pupils are equal, round, and reactive to light. Neck:      Musculoskeletal: No muscular tenderness. Cardiovascular:      Rate and Rhythm: Normal rate and regular rhythm. Heart sounds: Normal heart sounds. No murmur. Comments: No carotid bruit  Pulmonary:      Effort: Pulmonary effort is normal.      Breath sounds: Normal breath sounds. Abdominal:      General: Bowel sounds are normal.      Palpations: Abdomen is soft. Musculoskeletal:      Right shoulder: She exhibits decreased range of motion, tenderness, bony tenderness and pain. Right hip: She exhibits decreased range of motion. Right lower leg: No edema. Left lower leg: No edema. Comments: Right shoulder tenderness anterior and posterior and lateral. Right hip and lower back tenderness of bone and muscle. Lymphadenopathy:      Cervical: No cervical adenopathy. Skin:     General: Skin is warm and dry. Capillary Refill: Capillary refill takes less than 2 seconds. Neurological:      Mental Status: She is alert and oriented to person, place, and time. Cranial Nerves: No cranial nerve deficit. Psychiatric:         Mood and Affect: Mood normal.         Thought Content:  Thought content normal.         Judgment: Judgment normal.         Assessment:       Diagnosis Orders 1. Frequent falls     2. Morbid obesity ([address]     3. Acute pain of right shoulder  XR SHOULDER RIGHT 1 VW    traMADol (ULTRAM) 50 MG tablet   4. Right hip pain  XR HIP RIGHT (2-3 VIEWS)    traMADol (ULTRAM) 50 MG tablet   5. High risk medication use  POCT Rapid Drug Screen        Plan:    X-ray of right shoulder and right hip  Declined physical therapy  Apply ice to shoulder and hip 3x/day  Tramadol 50 mg as needed up to 3x/day    Return in about 2 months (around 9/24/2020) for annual medicare wellness. Orders Placed This Encounter   Procedures    XR HIP RIGHT (2-3 VIEWS)     Standing Status:   Future     Standing Expiration Date:   7/24/2021     Order Specific Question:   Reason for exam:     Answer:   pain    XR SHOULDER RIGHT 1 VW     Standing Status:   Future     Standing Expiration Date:   7/24/2021     Order Specific Question:   Reason for exam:     Answer:   pain    POCT Rapid Drug Screen     Orders Placed This Encounter   Medications    traMADol (ULTRAM) 50 MG tablet     Sig: Take 1 tablet by mouth every 8 hours as needed for Pain for up to 7 days. Dispense:  20 tablet     Refill:  0     Reduce doses taken as pain becomes manageable       Patient given educationalmaterials - see patient instructions. Discussed use, benefit, and side effectsof prescribed medications. All patient questions answered. Pt voiced understanding. Reviewed health maintenance. Instructed to continue current medications, diet andexercise. Patient agreed with treatment plan. Follow up as directed.      Electronicallysigned by JON Hernandez CNP on 7/24/2020 at 3:17 PM

## 2020-08-05 ENCOUNTER — HOSPITAL ENCOUNTER (OUTPATIENT)
Age: 80
Discharge: HOME | End: 2020-08-07
Payer: MEDICARE

## 2020-08-05 ENCOUNTER — HOSPITAL ENCOUNTER (OUTPATIENT)
Dept: GENERAL RADIOLOGY | Age: 80
Discharge: HOME | End: 2020-08-07
Payer: MEDICARE

## 2020-08-05 PROCEDURE — 73020 X-RAY EXAM OF SHOULDER: CPT

## 2020-08-05 PROCEDURE — 73502 X-RAY EXAM HIP UNI 2-3 VIEWS: CPT

## 2020-08-06 RX ORDER — LISINOPRIL AND HYDROCHLOROTHIAZIDE 20; 12.5 MG/1; MG/1
TABLET ORAL
Qty: 90 TABLET | Refills: 3 | Status: SHIPPED | OUTPATIENT
Start: 2020-08-06 | End: 2021-08-02

## 2020-08-25 ENCOUNTER — TELEPHONE (OUTPATIENT)
Dept: PRIMARY CARE CLINIC | Age: 80
End: 2020-08-25

## 2020-08-25 RX ORDER — TOLTERODINE 4 MG/1
CAPSULE, EXTENDED RELEASE ORAL
Qty: 90 CAPSULE | Refills: 3 | Status: SHIPPED | OUTPATIENT
Start: 2020-08-25 | End: 2021-08-23

## 2020-08-25 NOTE — TELEPHONE ENCOUNTER
8/25/20 lm with pt to return call and reschedule appt due to provider being out of the office on that day/ah

## 2020-09-30 ENCOUNTER — OFFICE VISIT (OUTPATIENT)
Dept: PRIMARY CARE CLINIC | Age: 80
End: 2020-09-30
Payer: MEDICARE

## 2020-09-30 VITALS
TEMPERATURE: 97 F | HEIGHT: 62 IN | DIASTOLIC BLOOD PRESSURE: 88 MMHG | BODY MASS INDEX: 45.05 KG/M2 | HEART RATE: 67 BPM | WEIGHT: 244.8 LBS | SYSTOLIC BLOOD PRESSURE: 138 MMHG | OXYGEN SATURATION: 98 %

## 2020-09-30 PROBLEM — E03.9 ACQUIRED HYPOTHYROIDISM: Status: ACTIVE | Noted: 2020-09-30

## 2020-09-30 PROCEDURE — G0008 ADMIN INFLUENZA VIRUS VAC: HCPCS | Performed by: FAMILY MEDICINE

## 2020-09-30 PROCEDURE — 4040F PNEUMOC VAC/ADMIN/RCVD: CPT | Performed by: FAMILY MEDICINE

## 2020-09-30 PROCEDURE — 90694 VACC AIIV4 NO PRSRV 0.5ML IM: CPT | Performed by: FAMILY MEDICINE

## 2020-09-30 PROCEDURE — G0439 PPPS, SUBSEQ VISIT: HCPCS | Performed by: FAMILY MEDICINE

## 2020-09-30 PROCEDURE — 1123F ACP DISCUSS/DSCN MKR DOCD: CPT | Performed by: FAMILY MEDICINE

## 2020-09-30 RX ORDER — AZITHROMYCIN 250 MG/1
250 TABLET, FILM COATED ORAL SEE ADMIN INSTRUCTIONS
Qty: 6 TABLET | Refills: 0 | Status: SHIPPED | OUTPATIENT
Start: 2020-09-30 | End: 2020-10-05

## 2020-09-30 RX ORDER — GABAPENTIN 300 MG/1
300 CAPSULE ORAL 3 TIMES DAILY
Qty: 270 CAPSULE | Refills: 1 | Status: SHIPPED | OUTPATIENT
Start: 2020-09-30 | End: 2021-05-06 | Stop reason: SINTOL

## 2020-09-30 RX ORDER — TOLTERODINE 4 MG/1
4 CAPSULE, EXTENDED RELEASE ORAL 2 TIMES DAILY
Qty: 180 CAPSULE | Refills: 3 | Status: SHIPPED | OUTPATIENT
Start: 2020-09-30 | End: 2022-03-29 | Stop reason: SDUPTHER

## 2020-09-30 ASSESSMENT — PATIENT HEALTH QUESTIONNAIRE - PHQ9
2. FEELING DOWN, DEPRESSED OR HOPELESS: 1
SUM OF ALL RESPONSES TO PHQ QUESTIONS 1-9: 2
SUM OF ALL RESPONSES TO PHQ9 QUESTIONS 1 & 2: 2
1. LITTLE INTEREST OR PLEASURE IN DOING THINGS: 1
SUM OF ALL RESPONSES TO PHQ QUESTIONS 1-9: 2

## 2020-09-30 NOTE — PROGRESS NOTES
Medicare Annual Wellness Visit  Name: Keyona Morgan Date: 2020   MRN: [id number] Sex: Female   Age: 78 y.o. Ethnicity: Non-/Non    : 1940 Race: Marck Davies is here for Medicare AWV and Flu Vaccine    Screenings for behavioral, psychosocial and functional/safety risks, and cognitive dysfunction are all negative except as indicated below. These results, as well as other patient data from the [address] form, are documented in Flowsheets linked to this Encounter. Allergies   Allergen Reactions    Doxycycline Other (See Comments)     dizzyness    Bactrim [Sulfamethoxazole-Trimethoprim]     Codeine     Penicillins     Percocet [Oxycodone-Acetaminophen]          Prior to Visit Medications    Medication Sig Taking? Authorizing Provider   gabapentin (NEURONTIN) 300 MG capsule Take 1 capsule by mouth 3 times daily for 90 days. Intended supply: 30 days Yes Dyllan Ritter MD   tolterodine (DETROL LA) 4 MG extended release capsule Take 1 capsule by mouth 2 times daily Indications: Pt states it is no longer working - she is still taking it.  Yes Dyllan Ritter MD   tolterodine (DETROL LA) 4 MG extended release capsule TAKE 1 CAPSULE DAILY Yes Dyllan Ritter MD   lisinopril-hydroCHLOROthiazide [facility]) 20-12.5 MG per tablet TAKE 1 TABLET DAILY Yes Dyllan Ritter MD   esomeprazole ([address]) 20 MG delayed release capsule TAKE 1 CAPSULE DAILY Yes Dyllan Ritter MD   levothyroxine (SYNTHROID) 50 MCG tablet TAKE 1 TABLET DAILY Yes Dyllan Ritter MD   simvastatin (ZOCOR) 40 MG tablet TAKE 1 TABLET DAILY Yes Dyllan Ritter MD   FLUoxetine (PROZAC) 40 MG capsule TAKE 1 CAPSULE DAILY Yes Dyllan Ritter MD         Past Medical History:   Diagnosis Date    Acute sinusitis     Anxiety     Arthritis     Attic perforation of tympanic membrane, right ear     Colitis     Cramp of limb     Depression     Difficulty breathing     Diverticulosis     Elbow injury     GERD (gastroesophageal reflux disease)     H/O acne     H/O acute bronchitis     H/O breast lump     H/O Clostridium difficile infection     H/O diarrhea     H/O diverticulitis of colon     H/O seborrheic keratosis     H/O: hypothyroidism     Hyperlipidemia     Hypertension     Hypothyroid     Joint pain, hip     OA (osteoarthritis)     Obesity     Polyuria     Shoulder injury     Urine, incontinence, stress female     Varicose vein     Vertigo        Past Surgical History:   Procedure Laterality Date    ABDOMEN SURGERY      laparotomy    APPENDECTOMY      BREAST LUMPECTOMY Left     BREAST LUMPECTOMY Left 5/15/14    CARPAL TUNNEL RELEASE Bilateral     CATARACT REMOVAL WITH IMPLANT Bilateral     CHOLECYSTECTOMY      COLONOSCOPY      COLONOSCOPY  4-29-14    diverticulosis, int. hemorroids    DILATION AND CURETTAGE OF UTERUS      ENDOSCOPY, COLON, DIAGNOSTIC      ENDOSCOPY, COLON, DIAGNOSTIC  4-29-14    polyps removed, Bx    ESOPHAGUS SURGERY      FOOT SURGERY Bilateral     bunion removal     FRACTURE SURGERY      nasal    HERNIA REPAIR      Hiatal    INCISIONAL HERNIA REPAIR  5/15/14    with periumbilical hernia repair with mesh    JOINT REPLACEMENT Bilateral     Hips    JOINT REPLACEMENT Bilateral     Knees    SHOULDER SURGERY Left     SINUS SURGERY      18 surgeries on sinuses    SPLENECTOMY      TONSILLECTOMY      TYMPANOSTOMY TUBE PLACEMENT Bilateral          Family History   Problem Relation Age of Onset    Hypertension Mother         MI,CAD    Stroke Mother     Hypertension Father         MI, CAD    Stroke Father     Other Brother         CAD       CareTeam (Including outside providers/suppliers regularly involved in providing care):   Patient Care Team:  Leslie Christopher MD as PCP - Elvira Lai MD as PCP - [facility] Empaneled Provider    Wt Readings from Last 3 Encounters:   09/30/20 244 lb 12.8 oz (111 kg)   07/24/20 239 lb (108.4 kg)   05/26/20 240 lb (108.9 kg)     Vitals:    09/30/20 1324   BP: 138/88   Pulse: 67   Temp: 97 °F (36.1 °C)   SpO2: 98%   Weight: 244 lb 12.8 oz (111 kg)   Height: 5' 2.04\" (1.576 m)     Body mass index is 44.72 kg/m². Based upon direct observation of the patient, evaluation of cognition reveals remote memory intact, recent memory impaired. General Appearance: alert and oriented to person, place and time, well developed and well- nourished, in no acute distress  Skin: warm and dry, no rash or erythema  Head: normocephalic and atraumatic  Eyes: pupils equal, round, and reactive to light, extraocular eye movements intact, conjunctivae normal  ENT: tympanic membrane, external ear and ear canal normal bilaterally, nose without deformity, nasal mucosa and turbinates normal without polyps  Neck: supple and non-tender without mass, no thyromegaly or thyroid nodules, no cervical lymphadenopathy  Pulmonary/Chest: clear to auscultation bilaterally- no wheezes, rales or rhonchi, normal air movement, no respiratory distress  Cardiovascular: normal rate, regular rhythm, normal S1 and S2, no murmurs, rubs, clicks, or gallops, distal pulses intact, no carotid bruits  Abdomen: soft, non-tender, non-distended, normal bowel sounds, no masses or organomegaly  Extremities: no cyanosis, clubbing or edema  Musculoskeletal: normal range of motion, no joint swelling, deformity or tenderness  Neurologic: reflexes normal and symmetric, no cranial nerve deficit, gait, coordination and speech normal    Patient's complete Health Risk Assessment and screening values have been reviewed and are found in Flowsheets. The following problems were reviewed today and where indicated follow up appointments were made and/or referrals ordered.     Positive Risk Factor Screenings with Interventions:     General Health:  General  In general, how would you say your health is?: Good  In the past 7 days, have you experienced any of the following? New or Increased Pain, New or Increased Fatigue, Loneliness, Social Isolation, Stress or Anger?: None of These  Do you get the social and emotional support that you need?: (!) No  Do you have a Living Will?: Yes  General Health Risk Interventions:  · Has living will and POA. Health Habits/Nutrition:  Health Habits/Nutrition  Do you exercise for at least 20 minutes 2-3 times per week?: (!) No  Have you lost any weight without trying in the past 3 months?: No  Do you eat fewer than 2 meals per day?: No  Have you seen a dentist within the past year?: (!) No  Body mass index is 44.72 kg/m².   Health Habits/Nutrition Interventions:  · Has dentures    Hearing/Vision:  No exam data present  Hearing/Vision  Do you or your family notice any trouble with your hearing?: (!) Yes  Do you have difficulty driving, watching TV, or doing any of your daily activities because of your eyesight?: (!) Yes  Have you had an eye exam within the past year?: Yes  Hearing/Vision Interventions:  · Hearing decreased, does not want hearing aids    Personalized Preventive Plan   Current Health Maintenance Status  Immunization History   Administered Date(s) Administered    Influenza Vaccine, unspecified formulation 09/21/2011, 11/06/2012, 01/21/2014, 10/22/2014, 10/13/2015, 09/02/2016    Influenza Virus Vaccine 01/21/2014, 10/22/2014, 10/13/2015    Influenza, High Dose (Fluzone 65 yrs and older) 09/02/2016, 04/25/2018, 08/30/2018    Influenza, Triv, inactivated, subunit, adjuvanted, IM (Fluad 65 yrs and older) 09/26/2019    Pneumococcal Conjugate 13-valent ([id number]) 04/27/2015    Pneumococcal Conjugate 7-valent (Prevnar7) 04/27/2015    Pneumococcal Polysaccharide ([id number]) 01/03/2003, 12/10/2018        Health Maintenance   Topic Date Due    Meningococcal (ACWY) vaccine (1 - Risk start before 7 months 4-dose series) 1940    Hib vaccine (1 of 1 - Risk 1-dose series) 11/19/1939    Meningococcal B vaccine (1 of 4 - Increased Risk Bexsero 2-dose series) 08/19/1948    DTaP/Tdap/Td vaccine (1 - Tdap) 08/19/1957    Shingles Vaccine (1 of 2) 08/19/1988    Annual Wellness Visit (AWV)  06/23/2019    Flu vaccine (1) 09/01/2020    Lipid screen  02/20/2021    Potassium monitoring  02/20/2021    Creatinine monitoring  02/20/2021    DEXA (modify frequency per FRAX score)  Completed    Pneumococcal 65+ yrs at Risk Vaccine  Completed    Hepatitis A vaccine  Aged Out    Hepatitis B vaccine  Aged Out     Recommendations for [employer] Due: see orders and patient instructions/AVS.  . Recommended screening schedule for the next 5-10 years is provided to the patient in written form: see Patient Instructions/AVS.    Kaleb Ba was seen today for medicare awv and flu vaccine. Diagnoses and all orders for this visit:    Routine general medical examination at a health care facility    Neuropathy  -     gabapentin (NEURONTIN) 300 MG capsule; Take 1 capsule by mouth 3 times daily for 90 days. Intended supply: 30 days    Acquired hypothyroidism    Essential hypertension  -     Basic Metabolic Panel; Future    Mixed stress and urge urinary incontinence  -     tolterodine (DETROL LA) 4 MG extended release capsule; Take 1 capsule by mouth 2 times daily Indications: Pt states it is no longer working - she is still taking it. Other orders  -     INFLUENZA, QUADV, ADJUVANTED, 72 YRS =, IM, PF, PREFILL SYR, 0.5ML (FLUAD)        Z-Fabricio for left maxillary sinus infection for past 2 weeks. Restart gabapentin 3 times a day per patient request for shoulder pain. Increase Detrol LA to 4 mg twice daily for incontinence. BMP ordered.   Flu shot today

## 2020-10-29 ENCOUNTER — NURSE TRIAGE (OUTPATIENT)
Dept: OTHER | Facility: CLINIC | Age: 80
End: 2020-10-29

## 2020-10-29 NOTE — TELEPHONE ENCOUNTER
Reason for Disposition   SEVERE pain (e.g., excruciating, unable to do any normal activities)    Answer Assessment - Initial Assessment Questions  1. ONSET: \"When did the pain start? \"       Yesterday    2. LOCATION: \"Where is the pain located? \"       Left foot and ankle    3. PAIN: \"How bad is the pain? \"    (Scale 1-10; or mild, moderate, severe)    -  MILD (1-3): doesn't interfere with normal activities     -  MODERATE (4-7): interferes with normal activities (e.g., work or school) or awakens from sleep, limping     -  SEVERE (8-10): excruciating pain, unable to do any normal activities, unable to walk      10/10    4. WORK OR EXERCISE: \"Has there been any recent work or exercise that involved this part of the body? \"       No recent injuries    5. CAUSE: \"What do you think is causing the foot pain? \"      Swelling    6. OTHER SYMPTOMS: \"Do you have any other symptoms? \" (e.g., leg pain, rash, fever, numbness)      Swelling in foot and ankle    7. PREGNANCY: \"Is there any chance you are pregnant? \" \"When was your last menstrual period? \"      N/A    Protocols used: FOOT PAIN-ADULT-[place]    Pt reports left foot and ankle pain and swelling since yesterday. Rates pain 10/10. Has been taking Tylenol with no relief. Has been elevating, swelling has not reduced. No known injuries. PT reports foot is red in color. Reviewed for pt to be seen today with PCP, pt states she cannot be seen today d/t her not having a car and no one to take her. States she could go in the AM. Instructed pt to be seen in [facility] if no appt available or if symptoms worsen. Warm transfer to [facility] in [facility]. Caller provided care advice and instructed to call back with worsening symptoms. Attention Provider: Thank you for allowing me to participate in the care of your patient. The patient was connected to triage in response to information provided to the [facility].   Please do not respond through this encounter as the response is not directed to a shared pool.

## 2020-10-30 ENCOUNTER — HOSPITAL ENCOUNTER (OUTPATIENT)
Dept: VASCULAR LAB | Age: 80
Discharge: HOME | End: 2020-10-30
Payer: MEDICARE

## 2020-10-30 ENCOUNTER — HOSPITAL ENCOUNTER (OUTPATIENT)
Age: 80
Discharge: HOME | End: 2020-10-30
Payer: MEDICARE

## 2020-10-30 ENCOUNTER — OFFICE VISIT (OUTPATIENT)
Dept: PRIMARY CARE CLINIC | Age: 80
End: 2020-10-30
Payer: MEDICARE

## 2020-10-30 VITALS
DIASTOLIC BLOOD PRESSURE: 78 MMHG | SYSTOLIC BLOOD PRESSURE: 156 MMHG | TEMPERATURE: 97.4 F | HEART RATE: 73 BPM | OXYGEN SATURATION: 98 %

## 2020-10-30 LAB
ABSOLUTE EOS #: 0 K/UL (ref 0–0.4)
ABSOLUTE IMMATURE GRANULOCYTE: ABNORMAL K/UL (ref 0–0.3)
ABSOLUTE LYMPH #: 1.4 K/UL (ref 1–4.8)
ABSOLUTE MONO #: 0.8 K/UL (ref 0.1–1.3)
ALBUMIN SERPL-MCNC: 3.7 G/DL (ref 3.5–5.2)
ALBUMIN/GLOBULIN RATIO: NORMAL (ref 1–2.5)
ALP BLD-CCNC: 98 U/L (ref 35–104)
ALT SERPL-CCNC: 9 U/L (ref 5–33)
ANION GAP SERPL CALCULATED.3IONS-SCNC: 11 MMOL/L (ref 9–17)
AST SERPL-CCNC: 17 U/L
BASOPHILS # BLD: 0 % (ref 0–2)
BASOPHILS ABSOLUTE: 0 K/UL (ref 0–0.2)
BILIRUB SERPL-MCNC: 0.4 MG/DL (ref 0.3–1.2)
BILIRUBIN DIRECT: 0.12 MG/DL
BILIRUBIN, INDIRECT: 0.28 MG/DL (ref 0–1)
BUN BLDV-MCNC: 29 MG/DL (ref 8–23)
BUN/CREAT BLD: ABNORMAL (ref 9–20)
CALCIUM SERPL-MCNC: 9.2 MG/DL (ref 8.6–10.4)
CHLORIDE BLD-SCNC: 99 MMOL/L (ref 98–107)
CO2: 25 MMOL/L (ref 20–31)
CREAT SERPL-MCNC: 1.26 MG/DL (ref 0.5–0.9)
DIFFERENTIAL TYPE: ABNORMAL
EOSINOPHILS RELATIVE PERCENT: 0 % (ref 0–4)
GFR AFRICAN AMERICAN: 49 ML/MIN
GFR NON-AFRICAN AMERICAN: 41 ML/MIN
GFR SERPL CREATININE-BSD FRML MDRD: ABNORMAL ML/MIN/{1.73_M2}
GFR SERPL CREATININE-BSD FRML MDRD: ABNORMAL ML/MIN/{1.73_M2}
GLOBULIN: NORMAL G/DL (ref 1.5–3.8)
GLUCOSE BLD-MCNC: 111 MG/DL (ref 70–99)
HCT VFR BLD CALC: 39.7 % (ref 36–46)
HEMOGLOBIN: 13.1 G/DL (ref 12–16)
IMMATURE GRANULOCYTES: ABNORMAL %
LYMPHOCYTES # BLD: 11 % (ref 24–44)
MCH RBC QN AUTO: 30.1 PG (ref 26–34)
MCHC RBC AUTO-ENTMCNC: 33 G/DL (ref 31–37)
MCV RBC AUTO: 91.1 FL (ref 80–100)
MONOCYTES # BLD: 7 % (ref 1–7)
NRBC AUTOMATED: ABNORMAL PER 100 WBC
PDW BLD-RTO: 13.8 % (ref 11.5–14.9)
PLATELET # BLD: 291 K/UL (ref 150–450)
PLATELET ESTIMATE: ABNORMAL
PMV BLD AUTO: 10.1 FL (ref 6–12)
POTASSIUM SERPL-SCNC: 4.2 MMOL/L (ref 3.7–5.3)
RBC # BLD: 4.35 M/UL (ref 4–5.2)
RBC # BLD: ABNORMAL 10*6/UL
SEG NEUTROPHILS: 82 % (ref 36–66)
SEGMENTED NEUTROPHILS ABSOLUTE COUNT: 10 K/UL (ref 1.3–9.1)
SODIUM BLD-SCNC: 135 MMOL/L (ref 135–144)
TOTAL PROTEIN: 8 G/DL (ref 6.4–8.3)
TSH SERPL DL<=0.05 MIU/L-ACNC: 1.68 MIU/L (ref 0.3–5)
WBC # BLD: 12.3 K/UL (ref 3.5–11)
WBC # BLD: ABNORMAL 10*3/UL

## 2020-10-30 PROCEDURE — G8427 DOCREV CUR MEDS BY ELIG CLIN: HCPCS | Performed by: PHYSICIAN ASSISTANT

## 2020-10-30 PROCEDURE — 1036F TOBACCO NON-USER: CPT | Performed by: PHYSICIAN ASSISTANT

## 2020-10-30 PROCEDURE — G8399 PT W/DXA RESULTS DOCUMENT: HCPCS | Performed by: PHYSICIAN ASSISTANT

## 2020-10-30 PROCEDURE — 93971 EXTREMITY STUDY: CPT

## 2020-10-30 PROCEDURE — 85025 COMPLETE CBC W/AUTO DIFF WBC: CPT

## 2020-10-30 PROCEDURE — 84443 ASSAY THYROID STIM HORMONE: CPT

## 2020-10-30 PROCEDURE — 99214 OFFICE O/P EST MOD 30 MIN: CPT | Performed by: PHYSICIAN ASSISTANT

## 2020-10-30 PROCEDURE — 80076 HEPATIC FUNCTION PANEL: CPT

## 2020-10-30 PROCEDURE — 4040F PNEUMOC VAC/ADMIN/RCVD: CPT | Performed by: PHYSICIAN ASSISTANT

## 2020-10-30 PROCEDURE — 1123F ACP DISCUSS/DSCN MKR DOCD: CPT | Performed by: PHYSICIAN ASSISTANT

## 2020-10-30 PROCEDURE — G8417 CALC BMI ABV UP PARAM F/U: HCPCS | Performed by: PHYSICIAN ASSISTANT

## 2020-10-30 PROCEDURE — 36415 COLL VENOUS BLD VENIPUNCTURE: CPT

## 2020-10-30 PROCEDURE — G8484 FLU IMMUNIZE NO ADMIN: HCPCS | Performed by: PHYSICIAN ASSISTANT

## 2020-10-30 PROCEDURE — 96372 THER/PROPH/DIAG INJ SC/IM: CPT | Performed by: PHYSICIAN ASSISTANT

## 2020-10-30 PROCEDURE — 1090F PRES/ABSN URINE INCON ASSESS: CPT | Performed by: PHYSICIAN ASSISTANT

## 2020-10-30 PROCEDURE — 80048 BASIC METABOLIC PNL TOTAL CA: CPT

## 2020-10-30 RX ORDER — CEFTRIAXONE 1 G/1
1 INJECTION, POWDER, FOR SOLUTION INTRAMUSCULAR; INTRAVENOUS ONCE
Status: COMPLETED | OUTPATIENT
Start: 2020-10-30 | End: 2020-10-30

## 2020-10-30 RX ORDER — CEPHALEXIN 500 MG/1
500 CAPSULE ORAL 3 TIMES DAILY
Qty: 21 CAPSULE | Refills: 0 | Status: SHIPPED | OUTPATIENT
Start: 2020-10-30 | End: 2020-11-06

## 2020-10-30 RX ORDER — TRAMADOL HYDROCHLORIDE 50 MG/1
50 TABLET ORAL EVERY 8 HOURS PRN
Qty: 21 TABLET | Refills: 0 | Status: SHIPPED | OUTPATIENT
Start: 2020-10-30 | End: 2020-11-05 | Stop reason: DRUGHIGH

## 2020-10-30 RX ORDER — CEFTRIAXONE 1 G/1
1 INJECTION, POWDER, FOR SOLUTION INTRAMUSCULAR; INTRAVENOUS ONCE
Qty: 1 G | Refills: 0
Start: 2020-10-30 | End: 2020-10-30 | Stop reason: CLARIF

## 2020-10-30 RX ADMIN — CEFTRIAXONE 1 G: 1 INJECTION, POWDER, FOR SOLUTION INTRAMUSCULAR; INTRAVENOUS at 12:22

## 2020-10-30 ASSESSMENT — ENCOUNTER SYMPTOMS
COUGH: 0
SHORTNESS OF BREATH: 0
CHEST TIGHTNESS: 0

## 2020-10-30 NOTE — PROGRESS NOTES
[address] [address]  Dept: [phone]    Eliane Pallas is a 78 y.o. female who presents today for her medical conditions/complaintsas noted below. Chief Complaint   Patient presents with    Foot Pain     started wendesday.  Foot Swelling     right foot. not warm. redness. HPI:     HPI : The patient is a pleasant 23-year-old female who presents today with concerns of foot pain and swelling. Is located on her left foot. The patient states that she woke up Wednesday with swelling in her foot with no inciting factors. No SOB no Trauma leg is swollen. It is very painful and warm to the touch. She cannot walk on her leg. No infections or clots before.      LDL Cholesterol (mg/dL)   Date Value   02/20/2020 97   04/27/2012 92       (goal LDL is <100)   AST (U/L)   Date Value   02/10/2014 26     ALT (U/L)   Date Value   02/10/2014 31     BUN (mg/dL)   Date Value   02/20/2020 30 (H)     BP Readings from Last 3 Encounters:   10/30/20 (!) 156/78   09/30/20 138/88   07/24/20 124/82          (goal 120/80)    Past Medical History:   Diagnosis Date    Acute sinusitis     Anxiety     Arthritis     Attic perforation of tympanic membrane, right ear     Colitis     Cramp of limb     Depression     Difficulty breathing     Diverticulosis     Elbow injury     GERD (gastroesophageal reflux disease)     H/O acne     H/O acute bronchitis     H/O breast lump     H/O Clostridium difficile infection     H/O diarrhea     H/O diverticulitis of colon     H/O seborrheic keratosis     H/O: hypothyroidism     Hyperlipidemia     Hypertension     Hypothyroid     Joint pain, hip     OA (osteoarthritis)     Obesity     Polyuria     Shoulder injury     Urine, incontinence, stress female     Varicose vein     Vertigo       Past Surgical History:   Procedure Laterality Date    ABDOMEN SURGERY      laparotomy    no longer working - she is still taking it. 180 capsule 3    tolterodine (DETROL LA) 4 MG extended release capsule TAKE 1 CAPSULE DAILY 90 capsule 3    lisinopril-hydroCHLOROthiazide (PRINZIDE;ZESTORETIC) 20-12.5 MG per tablet TAKE 1 TABLET DAILY 90 tablet 3    esomeprazole (NEXIUM) 20 MG delayed release capsule TAKE 1 CAPSULE DAILY 90 capsule 3    levothyroxine (SYNTHROID) 50 MCG tablet TAKE 1 TABLET DAILY 90 tablet 3    simvastatin (ZOCOR) 40 MG tablet TAKE 1 TABLET DAILY 90 tablet 4    FLUoxetine (PROZAC) 40 MG capsule TAKE 1 CAPSULE DAILY 90 capsule 4     No current facility-administered medications for this visit. Allergies   Allergen Reactions    Doxycycline Other (See Comments)     dizzyness    Bactrim [Sulfamethoxazole-Trimethoprim]     Codeine     Penicillins     Percocet [Oxycodone-Acetaminophen]        Health Maintenance   Topic Date Due    Meningococcal (ACWY) vaccine (1 - Risk start before 7 months 4-dose series) 1938    Hib vaccine (1 of 1 - Risk 1-dose series) 11/19/1939    Meningococcal B vaccine (1 of 4 - Increased Risk Bexsero 2-dose series) 08/19/1948    DTaP/Tdap/Td vaccine (1 - Tdap) 08/19/1957    Shingles Vaccine (1 of 2) 08/19/1988    Lipid screen  02/20/2021    TSH testing  02/20/2021    Potassium monitoring  02/20/2021    Creatinine monitoring  02/20/2021    Annual Wellness Visit (AWV)  10/01/2021    DEXA (modify frequency per FRAX score)  Completed    Flu vaccine  Completed    Pneumococcal 65+ yrs at Risk Vaccine  Completed    Hepatitis A vaccine  Aged Out    Hepatitis B vaccine  Aged Out       Subjective:      Review of Systems   Constitutional: Negative for chills and fever. Respiratory: Negative for cough, chest tightness and shortness of breath. Cardiovascular: Positive for leg swelling (left foot and ankle ). Musculoskeletal: Positive for arthralgias, gait problem (unable to walk due to pain ), joint swelling and myalgias.    Neurological: Negative for numbness. Objective:     BP (!) 156/78   Pulse 73   Temp 97.4 °F (36.3 °C)   SpO2 98%   Physical Exam  Constitutional:       General: She is not in acute distress. Appearance: Normal appearance. She is obese. She is not ill-appearing. HENT:      Head: Normocephalic and atraumatic. Right Ear: External ear normal.      Left Ear: External ear normal.      Nose: Nose normal.      Mouth/Throat:      Mouth: Mucous membranes are moist.   Eyes:      Extraocular Movements: Extraocular movements intact. Conjunctiva/sclera: Conjunctivae normal.      Pupils: Pupils are equal, round, and reactive to light. Neck:      Musculoskeletal: Normal range of motion and neck supple. Vascular: No carotid bruit. Cardiovascular:      Rate and Rhythm: Normal rate and regular rhythm. Pulses: Normal pulses. Heart sounds: Normal heart sounds. Pulmonary:      Effort: Pulmonary effort is normal. No respiratory distress. Breath sounds: Normal breath sounds. Abdominal:      General: Bowel sounds are normal. There is no distension. Tenderness: There is no abdominal tenderness. Musculoskeletal: Normal range of motion. General: Swelling and tenderness present. Left lower leg: Edema present. Feet:       Comments: Extreme swelling and tenderness over the left lower extremity. Pain and tenderness in the left calf. Greatly decreased range of motion due to pain. Lymphadenopathy:      Cervical: No cervical adenopathy. Skin:     General: Skin is warm and dry. Findings: Erythema (left foot and ankle ) present. Comments: Warmth swelling erythema of left lower extremity. Neurological:      General: No focal deficit present. Mental Status: She is alert and oriented to person, place, and time. Psychiatric:         Mood and Affect: Mood normal.         Behavior: Behavior normal.         Thought Content:  Thought content normal.         Assessment: Diagnosis Orders   1. Edema of left lower extremity  CBC Auto Differential    US DUP LOWER EXTREMITY LEFT EDIN    traMADol (ULTRAM) 50 MG tablet    TSH With Reflex Ft4    Basic Metabolic Panel    Hepatic Function Panel    cephALEXin (KEFLEX) 500 MG capsule    cefTRIAXone (ROCEPHIN) injection 1 g    DISCONTINUED: cefTRIAXone (ROCEPHIN) 1 g injection        Plan:       Blood work ordered  Patient given injection of Rocephin in the office. Sent home on Keflex   Stat Doppler ordered for patient. Follow-up if symptoms do not improve. Tramadol ordered for pain. Patient educated on signs and symptoms of pulmonary embolism and to go to the ER emergency if symptoms worsen she gets fever or has chest pain or cough or shortness of breath. Return for Follow up if symptoms persist or worsen. Orders Placed This Encounter   Procedures    US DUP LOWER EXTREMITY LEFT EDIN     Standing Status:   Future     Number of Occurrences:   1     Standing Expiration Date:   10/30/2021     Order Specific Question:   Reason for exam:     Answer:   lower extermity edema and pain    CBC Auto Differential     Standing Status:   Future     Number of Occurrences:   1     Standing Expiration Date:   10/31/2021    TSH With Reflex Ft4     Standing Status:   Future     Number of Occurrences:   1     Standing Expiration Date:   10/30/2021    Basic Metabolic Panel     Standing Status:   Future     Number of Occurrences:   1     Standing Expiration Date:   10/30/2021    Hepatic Function Panel     Standing Status:   Future     Number of Occurrences:   1     Standing Expiration Date:   10/30/2021     Orders Placed This Encounter   Medications    traMADol (ULTRAM) 50 MG tablet     Sig: Take 1 tablet by mouth every 8 hours as needed for Pain for up to 7 days.  1-2 tablets every 6 hours prn     Dispense:  21 tablet     Refill:  0     Reduce doses taken as pain becomes manageable    DISCONTD: cefTRIAXone (ROCEPHIN) 1 g injection     Sig: Inject 1 g into the muscle once for 1 dose     Dispense:  1 g     Refill:  0    cephALEXin (KEFLEX) 500 MG capsule     Sig: Take 1 capsule by mouth 3 times daily for 7 days     Dispense:  21 capsule     Refill:  0    cefTRIAXone (ROCEPHIN) injection 1 g     Order Specific Question:   Antimicrobial Indications     Answer: Other     Order Specific Question:   Other Abx Indication     Answer:   edema lower extremity       Patient given educationalmaterials - see patient instructions. Discussed use, benefit, and side effectsof prescribed medications. All patient questions answered. Pt voiced understanding. Reviewed health maintenance. Instructed to continue current medications, diet andexercise. Patient agreed with treatment plan. Follow up as directed.      Electronicallysigned by UGO Wilks on 10/30/2020 at 1:44 PM

## 2020-11-05 ENCOUNTER — TELEPHONE (OUTPATIENT)
Dept: PRIMARY CARE CLINIC | Age: 80
End: 2020-11-05

## 2020-11-05 RX ORDER — TRAMADOL HYDROCHLORIDE 50 MG/1
50 TABLET ORAL EVERY 8 HOURS PRN
Qty: 21 TABLET | Refills: 0 | Status: SHIPPED
Start: 2020-11-05 | End: 2020-11-12

## 2020-11-10 ENCOUNTER — HOSPITAL ENCOUNTER (OUTPATIENT)
Age: 80
Discharge: HOME | End: 2020-11-10
Payer: MEDICARE

## 2020-11-10 ENCOUNTER — HOSPITAL ENCOUNTER (OUTPATIENT)
Age: 80
Discharge: HOME | End: 2020-11-12
Payer: MEDICARE

## 2020-11-10 ENCOUNTER — OFFICE VISIT (OUTPATIENT)
Dept: PRIMARY CARE CLINIC | Age: 80
End: 2020-11-10
Payer: MEDICARE

## 2020-11-10 ENCOUNTER — HOSPITAL ENCOUNTER (OUTPATIENT)
Dept: GENERAL RADIOLOGY | Age: 80
Discharge: HOME | End: 2020-11-12
Payer: MEDICARE

## 2020-11-10 VITALS
WEIGHT: 243 LBS | BODY MASS INDEX: 44.39 KG/M2 | TEMPERATURE: 97.2 F | SYSTOLIC BLOOD PRESSURE: 136 MMHG | HEART RATE: 81 BPM | OXYGEN SATURATION: 97 % | DIASTOLIC BLOOD PRESSURE: 70 MMHG

## 2020-11-10 LAB — URIC ACID: 9.1 MG/DL (ref 2.4–5.7)

## 2020-11-10 PROCEDURE — 4040F PNEUMOC VAC/ADMIN/RCVD: CPT | Performed by: NURSE PRACTITIONER

## 2020-11-10 PROCEDURE — 1123F ACP DISCUSS/DSCN MKR DOCD: CPT | Performed by: NURSE PRACTITIONER

## 2020-11-10 PROCEDURE — G8484 FLU IMMUNIZE NO ADMIN: HCPCS | Performed by: NURSE PRACTITIONER

## 2020-11-10 PROCEDURE — G8427 DOCREV CUR MEDS BY ELIG CLIN: HCPCS | Performed by: NURSE PRACTITIONER

## 2020-11-10 PROCEDURE — G8399 PT W/DXA RESULTS DOCUMENT: HCPCS | Performed by: NURSE PRACTITIONER

## 2020-11-10 PROCEDURE — G8417 CALC BMI ABV UP PARAM F/U: HCPCS | Performed by: NURSE PRACTITIONER

## 2020-11-10 PROCEDURE — 73610 X-RAY EXAM OF ANKLE: CPT

## 2020-11-10 PROCEDURE — 99213 OFFICE O/P EST LOW 20 MIN: CPT | Performed by: NURSE PRACTITIONER

## 2020-11-10 PROCEDURE — 84550 ASSAY OF BLOOD/URIC ACID: CPT

## 2020-11-10 PROCEDURE — 36415 COLL VENOUS BLD VENIPUNCTURE: CPT

## 2020-11-10 PROCEDURE — 1090F PRES/ABSN URINE INCON ASSESS: CPT | Performed by: NURSE PRACTITIONER

## 2020-11-10 PROCEDURE — 1036F TOBACCO NON-USER: CPT | Performed by: NURSE PRACTITIONER

## 2020-11-10 RX ORDER — COLCHICINE 0.6 MG/1
TABLET ORAL
Qty: 3 TABLET | Refills: 0 | Status: SHIPPED | OUTPATIENT
Start: 2020-11-10

## 2020-11-10 ASSESSMENT — ENCOUNTER SYMPTOMS
COLOR CHANGE: 0
WHEEZING: 0
COUGH: 0
CHEST TIGHTNESS: 0
SHORTNESS OF BREATH: 0

## 2020-11-10 NOTE — PATIENT INSTRUCTIONS
Apply a compressive ACE bandage. Rest and elevate the affected painful area. Apply cold compresses intermittently as needed. As pain recedes, begin normal activities slowly as tolerated.   Call if symptoms persist.

## 2020-11-10 NOTE — PROGRESS NOTES
incontinence, stress female     Varicose vein     Vertigo       Past Surgical History:   Procedure Laterality Date    ABDOMEN SURGERY      laparotomy    APPENDECTOMY      BREAST LUMPECTOMY Left     BREAST LUMPECTOMY Left 5/15/14    CARPAL TUNNEL RELEASE Bilateral     CATARACT REMOVAL WITH IMPLANT Bilateral     CHOLECYSTECTOMY      COLONOSCOPY      COLONOSCOPY  14    diverticulosis, int. hemorroids    DILATION AND CURETTAGE OF UTERUS      ENDOSCOPY, COLON, DIAGNOSTIC      ENDOSCOPY, COLON, DIAGNOSTIC  14    polyps removed, Bx    ESOPHAGUS SURGERY      FOOT SURGERY Bilateral     bunion removal     FRACTURE SURGERY      nasal    HERNIA REPAIR      Hiatal    INCISIONAL HERNIA REPAIR  5/15/14    with periumbilical hernia repair with mesh    JOINT REPLACEMENT Bilateral     Hips    JOINT REPLACEMENT Bilateral     Knees    SHOULDER SURGERY Left     SINUS SURGERY      18 surgeries on sinuses    SPLENECTOMY      TONSILLECTOMY      TYMPANOSTOMY TUBE PLACEMENT Bilateral        Family History   Problem Relation Age of Onset    Hypertension Mother         MI,CAD    Stroke Mother     Hypertension Father         MI, CAD   [facility] Stroke Father     Other Brother         CAD       Social History     Tobacco Use    Smoking status: Former Smoker     Packs/day: 1.00     Years: 40.00     Pack years: 40.00     Types: Cigarettes     Last attempt to quit: 4/3/1999     Years since quittin.6    Smokeless tobacco: Never Used   Substance Use Topics    Alcohol use: Yes     Comment: socially       Current Outpatient Medications   Medication Sig Dispense Refill    traMADol (ULTRAM) 50 MG tablet Take 1 tablet by mouth every 8 hours as needed for Pain for up to 7 days. 21 tablet 0    gabapentin (NEURONTIN) 300 MG capsule Take 1 capsule by mouth 3 times daily for 90 days.  Intended supply: 30 days 270 capsule 1    tolterodine (DETROL LA) 4 MG extended release capsule Take 1 capsule by mouth 2 times daily Indications: Pt states it is no longer working - she is still taking it. 180 capsule 3    tolterodine (DETROL LA) 4 MG extended release capsule TAKE 1 CAPSULE DAILY 90 capsule 3    lisinopril-hydroCHLOROthiazide (PRINZIDE;ZESTORETIC) 20-12.5 MG per tablet TAKE 1 TABLET DAILY 90 tablet 3    esomeprazole (NEXIUM) 20 MG delayed release capsule TAKE 1 CAPSULE DAILY 90 capsule 3    levothyroxine (SYNTHROID) 50 MCG tablet TAKE 1 TABLET DAILY 90 tablet 3    simvastatin (ZOCOR) 40 MG tablet TAKE 1 TABLET DAILY 90 tablet 4    FLUoxetine (PROZAC) 40 MG capsule TAKE 1 CAPSULE DAILY 90 capsule 4     No current facility-administered medications for this visit. Allergies   Allergen Reactions    Doxycycline Other (See Comments)     dizzyness    Bactrim [Sulfamethoxazole-Trimethoprim]     Codeine     Penicillins     Percocet [Oxycodone-Acetaminophen]        Health Maintenance   Topic Date Due    Meningococcal (ACWY) vaccine (1 - Risk start before 7 months 4-dose series) 1938    Hib vaccine (1 of 1 - Risk 1-dose series) 11/19/1939    Meningococcal B vaccine (1 of 4 - Increased Risk Bexsero 2-dose series) 08/19/1948    DTaP/Tdap/Td vaccine (1 - Tdap) 08/19/1957    Shingles Vaccine (1 of 2) 08/19/1988    Lipid screen  02/20/2021    Annual Wellness Visit (AWV)  10/01/2021    TSH testing  10/30/2021    Potassium monitoring  10/30/2021    Creatinine monitoring  10/30/2021    DEXA (modify frequency per FRAX score)  Completed    Flu vaccine  Completed    Pneumococcal 65+ yrs at Risk Vaccine  Completed    Hepatitis A vaccine  Aged Out    Hepatitis B vaccine  Aged Out       Subjective:      Review of Systems   Constitutional: Negative for fever. Respiratory: Negative for cough, chest tightness, shortness of breath and wheezing. Cardiovascular: Negative for chest pain and leg swelling.    Musculoskeletal:        Left foot pain, lateral swelling, redness x1 week, worse with ambulation, relief with tramadol, no trauma or injury     Skin: Positive for itching. Negative for color change, rash and wound. Neurological: Negative for tingling and numbness. Objective:     /70   Pulse 81   Temp 97.2 °F (36.2 °C)   Wt 243 lb (110.2 kg)   SpO2 97%   BMI 44.39 kg/m²   Physical Exam  Constitutional:       Appearance: She is obese. HENT:      Head: Normocephalic and atraumatic. Neck:      Musculoskeletal: Normal range of motion and neck supple. Cardiovascular:      Rate and Rhythm: Normal rate and regular rhythm. Pulses:           Dorsalis pedis pulses are 0 on the left side. Posterior tibial pulses are 0 on the left side. Heart sounds: Normal heart sounds. No murmur. Pulmonary:      Effort: Pulmonary effort is normal.      Breath sounds: Normal breath sounds. Musculoskeletal:         General: Swelling present. Left foot: Decreased range of motion. No deformity or foot drop. Feet:      Left foot:      Skin integrity: No ulcer, blister, skin breakdown or warmth. Comments: Left lateral ankle edema, mild tenderness with palpation  Skin:     General: Skin is warm and dry. Capillary Refill: Capillary refill takes less than 2 seconds. Neurological:      Mental Status: She is alert and oriented to person, place, and time. Cranial Nerves: No cranial nerve deficit. Psychiatric:         Mood and Affect: Mood normal.         Thought Content: Thought content normal.         Judgment: Judgment normal.         Assessment:       Diagnosis Orders   1. Acute left ankle pain  XR ANKLE LEFT (2 VIEWS)    Uric Acid        Plan:     Apply a compressive ACE bandage. Rest and elevate the affected painful area. Apply cold compresses intermittently as needed. As pain recedes, begin normal activities slowly as tolerated. Call if symptoms persist.    Left ankle x-ray  Uric acid  Return if symptoms worsen or fail to improve.     Orders Placed This Encounter   Procedures    XR ANKLE LEFT (2 VIEWS)     Standing Status:   Future     Standing Expiration Date:   11/10/2021     Order Specific Question:   Reason for exam:     Answer:   pain    Uric Acid     Standing Status:   Future     Standing Expiration Date:   11/10/2021     No orders of the defined types were placed in this encounter. Patient given educationalmaterials - see patient instructions. Discussed use, benefit, and side effectsof prescribed medications. All patient questions answered. Pt voiced understanding. Reviewed health maintenance. Instructed to continue current medications, diet andexercise. Patient agreed with treatment plan. Follow up as directed.      Electronicallysigned by JON Kaur CNP on 11/10/2020 at 9:14 AM

## 2020-11-20 RX ORDER — FLUOXETINE HYDROCHLORIDE 40 MG/1
CAPSULE ORAL
Qty: 90 CAPSULE | Refills: 3 | Status: SHIPPED | OUTPATIENT
Start: 2020-11-20 | End: 2021-11-15

## 2020-11-25 RX ORDER — SIMVASTATIN 40 MG
TABLET ORAL
Qty: 90 TABLET | Refills: 3 | Status: SHIPPED | OUTPATIENT
Start: 2020-11-25 | End: 2021-10-28

## 2021-03-15 RX ORDER — LEVOTHYROXINE SODIUM 0.05 MG/1
TABLET ORAL
Qty: 90 TABLET | Refills: 3 | Status: SHIPPED | OUTPATIENT
Start: 2021-03-15 | End: 2022-02-15

## 2021-05-06 ENCOUNTER — OFFICE VISIT (OUTPATIENT)
Dept: PRIMARY CARE CLINIC | Age: 81
End: 2021-05-06
Payer: MEDICARE

## 2021-05-06 ENCOUNTER — HOSPITAL ENCOUNTER (OUTPATIENT)
Age: 81
Setting detail: SPECIMEN
Discharge: HOME | End: 2021-05-06
Payer: MEDICARE

## 2021-05-06 VITALS
HEART RATE: 91 BPM | BODY MASS INDEX: 45.56 KG/M2 | DIASTOLIC BLOOD PRESSURE: 84 MMHG | OXYGEN SATURATION: 96 % | SYSTOLIC BLOOD PRESSURE: 136 MMHG | WEIGHT: 249.4 LBS

## 2021-05-06 DIAGNOSIS — E66.01 MORBID OBESITY (HCC): ICD-10-CM

## 2021-05-06 DIAGNOSIS — M25.551 RIGHT HIP PAIN: ICD-10-CM

## 2021-05-06 DIAGNOSIS — I10 ESSENTIAL HYPERTENSION: Primary | ICD-10-CM

## 2021-05-06 DIAGNOSIS — I10 ESSENTIAL HYPERTENSION: ICD-10-CM

## 2021-05-06 DIAGNOSIS — G89.29 CHRONIC ELBOW PAIN, LEFT: ICD-10-CM

## 2021-05-06 DIAGNOSIS — M25.522 CHRONIC ELBOW PAIN, LEFT: ICD-10-CM

## 2021-05-06 LAB
ABSOLUTE EOS #: 0.2 K/UL (ref 0–0.44)
ABSOLUTE IMMATURE GRANULOCYTE: <0.03 K/UL (ref 0–0.3)
ABSOLUTE LYMPH #: 1.43 K/UL (ref 1.1–3.7)
ABSOLUTE MONO #: 0.52 K/UL (ref 0.1–1.2)
ANION GAP SERPL CALCULATED.3IONS-SCNC: 13 MMOL/L (ref 9–17)
BASOPHILS # BLD: 1 % (ref 0–2)
BASOPHILS ABSOLUTE: 0.05 K/UL (ref 0–0.2)
BUN BLDV-MCNC: 41 MG/DL (ref 8–23)
BUN/CREAT BLD: ABNORMAL (ref 9–20)
CALCIUM SERPL-MCNC: 9.1 MG/DL (ref 8.6–10.4)
CHLORIDE BLD-SCNC: 98 MMOL/L (ref 98–107)
CO2: 26 MMOL/L (ref 20–31)
CREAT SERPL-MCNC: 1.59 MG/DL (ref 0.5–0.9)
DIFFERENTIAL TYPE: ABNORMAL
EOSINOPHILS RELATIVE PERCENT: 3 % (ref 1–4)
GFR AFRICAN AMERICAN: 38 ML/MIN
GFR NON-AFRICAN AMERICAN: 31 ML/MIN
GFR SERPL CREATININE-BSD FRML MDRD: ABNORMAL ML/MIN/{1.73_M2}
GFR SERPL CREATININE-BSD FRML MDRD: ABNORMAL ML/MIN/{1.73_M2}
GLUCOSE BLD-MCNC: 96 MG/DL (ref 70–99)
HCT VFR BLD CALC: 43.7 % (ref 36.3–47.1)
HEMOGLOBIN: 13.5 G/DL (ref 11.9–15.1)
IMMATURE GRANULOCYTES: 0 %
LYMPHOCYTES # BLD: 19 % (ref 24–43)
MCH RBC QN AUTO: 29.1 PG (ref 25.2–33.5)
MCHC RBC AUTO-ENTMCNC: 30.9 G/DL (ref 28.4–34.8)
MCV RBC AUTO: 94.2 FL (ref 82.6–102.9)
MONOCYTES # BLD: 7 % (ref 3–12)
NRBC AUTOMATED: 0 PER 100 WBC
PDW BLD-RTO: 14.6 % (ref 11.8–14.4)
PLATELET # BLD: 367 K/UL (ref 138–453)
PLATELET ESTIMATE: ABNORMAL
PMV BLD AUTO: 11.7 FL (ref 8.1–13.5)
POTASSIUM SERPL-SCNC: 5.1 MMOL/L (ref 3.7–5.3)
RBC # BLD: 4.64 M/UL (ref 3.95–5.11)
RBC # BLD: ABNORMAL 10*6/UL
SEG NEUTROPHILS: 70 % (ref 36–65)
SEGMENTED NEUTROPHILS ABSOLUTE COUNT: 5.41 K/UL (ref 1.5–8.1)
SODIUM BLD-SCNC: 137 MMOL/L (ref 135–144)
WBC # BLD: 7.6 K/UL (ref 3.5–11.3)
WBC # BLD: ABNORMAL 10*3/UL

## 2021-05-06 PROCEDURE — 99214 OFFICE O/P EST MOD 30 MIN: CPT | Performed by: NURSE PRACTITIONER

## 2021-05-06 RX ORDER — LIDOCAINE 50 MG/G
1 PATCH TOPICAL DAILY
Qty: 90 PATCH | Refills: 1 | Status: SHIPPED | OUTPATIENT
Start: 2021-05-06 | End: 2021-06-05

## 2021-05-06 SDOH — ECONOMIC STABILITY: TRANSPORTATION INSECURITY
IN THE PAST 12 MONTHS, HAS THE LACK OF TRANSPORTATION KEPT YOU FROM MEDICAL APPOINTMENTS OR FROM GETTING MEDICATIONS?: PATIENT DECLINED

## 2021-05-06 SDOH — ECONOMIC STABILITY: INCOME INSECURITY: HOW HARD IS IT FOR YOU TO PAY FOR THE VERY BASICS LIKE FOOD, HOUSING, MEDICAL CARE, AND HEATING?: PATIENT DECLINED

## 2021-05-06 ASSESSMENT — ENCOUNTER SYMPTOMS
SHORTNESS OF BREATH: 0
NAUSEA: 0
ABDOMINAL PAIN: 1
COUGH: 0
DIARRHEA: 0
BACK PAIN: 1
CONSTIPATION: 0

## 2021-05-06 ASSESSMENT — PATIENT HEALTH QUESTIONNAIRE - PHQ9
SUM OF ALL RESPONSES TO PHQ9 QUESTIONS 1 & 2: 2
SUM OF ALL RESPONSES TO PHQ QUESTIONS 1-9: 2
SUM OF ALL RESPONSES TO PHQ QUESTIONS 1-9: 2
1. LITTLE INTEREST OR PLEASURE IN DOING THINGS: 0

## 2021-05-06 NOTE — PROGRESS NOTES
[facility]  [address] [place] [id number]  Dept: [phone] [place] Ronna Zarate is a 78 y.o. female Established patient, who presents today for her medical conditions/complaints as noted below. Chief Complaint   Patient presents with    Elbow Pain     patient stopped taking Gabapentin states it made her disoriented    Mass     2 lumps on scar on abdomen. HPI:     HPI  Left elbow pain most of the day. Whole elbow hurts but concentrated at lateral epicondyle.     She has two lumps and tender areas in abdominal incision,    Reviewed prior notes None  Reviewed previous      LDL Cholesterol (mg/dL)   Date Value   02/20/2020 97   04/27/2012 92       (goal LDL is <100)   AST (U/L)   Date Value   10/30/2020 17     ALT (U/L)   Date Value   10/30/2020 9     BUN (mg/dL)   Date Value   10/30/2020 29 (H)     TSH (mIU/L)   Date Value   10/30/2020 1.68     BP Readings from Last 3 Encounters:   05/06/21 136/84   11/10/20 136/70   10/30/20 (!) 156/78          (goal 120/80)    Past Medical History:   Diagnosis Date    Acute sinusitis     Anxiety     Arthritis     Attic perforation of tympanic membrane, right ear     Colitis     Cramp of limb     Depression     Difficulty breathing     Diverticulosis     Elbow injury     GERD (gastroesophageal reflux disease)     H/O acne     H/O acute bronchitis     H/O breast lump     H/O Clostridium difficile infection     H/O diarrhea     H/O diverticulitis of colon     H/O seborrheic keratosis     H/O: hypothyroidism     Hyperlipidemia     Hypertension     Hypothyroid     Joint pain, hip     OA (osteoarthritis)     Obesity     Polyuria     Shoulder injury     Urine, incontinence, stress female     Varicose vein     Vertigo       Past Surgical History:   Procedure Laterality Date    ABDOMEN SURGERY      laparotomy    APPENDECTOMY      BREAST LUMPECTOMY Left     BREAST LUMPECTOMY Left 5/15/14    CARPAL TUNNEL RELEASE Bilateral     CATARACT REMOVAL WITH IMPLANT Bilateral     CHOLECYSTECTOMY      COLONOSCOPY      COLONOSCOPY  14    diverticulosis, int. hemorroids    DILATION AND CURETTAGE OF UTERUS      ENDOSCOPY, COLON, DIAGNOSTIC      ENDOSCOPY, COLON, DIAGNOSTIC  14    polyps removed, Bx    ESOPHAGUS SURGERY      FOOT SURGERY Bilateral     bunion removal     FRACTURE SURGERY      nasal    HERNIA REPAIR      Hiatal    INCISIONAL HERNIA REPAIR  5/15/14    with periumbilical hernia repair with mesh    JOINT REPLACEMENT Bilateral     Hips    JOINT REPLACEMENT Bilateral     Knees    SHOULDER SURGERY Left     SINUS SURGERY      18 surgeries on sinuses    SPLENECTOMY      TONSILLECTOMY      TYMPANOSTOMY TUBE PLACEMENT Bilateral        Family History   Problem Relation Age of Onset    Hypertension Mother         MI,CAD    Stroke Mother     Hypertension Father         MI, CAD   Joellen Wilkins Stroke Father     Other Brother         CAD       Social History     Tobacco Use    Smoking status: Former Smoker     Packs/day: 1.00     Years: 40.00     Pack years: 40.00     Types: Cigarettes     Quit date: 4/3/1999     Years since quittin.1    Smokeless tobacco: Never Used   Substance Use Topics    Alcohol use: Yes     Comment: socially       Current Outpatient Medications   Medication Sig Dispense Refill    lidocaine (LIDODERM) 5 % Place 1 patch onto the skin daily 12 hours on, 12 hours off. 90 patch 1    levothyroxine (SYNTHROID) 50 MCG tablet TAKE 1 TABLET DAILY 90 tablet 3    simvastatin (ZOCOR) 40 MG tablet TAKE 1 TABLET DAILY 90 tablet 3    FLUoxetine (PROZAC) 40 MG capsule TAKE 1 CAPSULE DAILY 90 capsule 3    colchicine (COLCRYS) 0.6 MG tablet Take 2 tabs immediately, then 1 tablet 1 hour later. 3 tablet 0    tolterodine (DETROL LA) 4 MG extended release capsule Take 1 capsule by mouth 2 times daily Indications: Pt states it is no longer working - she is still taking it. Standing Status:   Future     Standing Expiration Date:   5/6/2022     Orders Placed This Encounter   Medications    lidocaine (LIDODERM) 5 %     Sig: Place 1 patch onto the skin daily 12 hours on, 12 hours off. Dispense:  90 patch     Refill:  1       Patient given educational materials - see patient instructions. Discussed use, benefit, and side effects of prescribed medications. All patient questions answered. Pt voiced understanding. Reviewed health maintenance. Instructed to continue current medications, diet and exercise. Patient agreed with treatment plan. Follow up as directed.      Electronically signed by JON Pitt CNP on 5/6/2021 at 9:30 AM

## 2021-05-06 NOTE — PATIENT INSTRUCTIONS
Continue fluoxetine daily  Start voltaren for elbow  Start Lidocaine patch for lower back pain and elbow  Complete lab work  Try to get out more

## 2021-05-07 DIAGNOSIS — I10 ESSENTIAL HYPERTENSION: Primary | ICD-10-CM

## 2021-05-07 DIAGNOSIS — R60.0 LOCALIZED EDEMA: ICD-10-CM

## 2021-05-19 DIAGNOSIS — K21.9 GASTROESOPHAGEAL REFLUX DISEASE WITHOUT ESOPHAGITIS: ICD-10-CM

## 2021-08-02 RX ORDER — LISINOPRIL AND HYDROCHLOROTHIAZIDE 20; 12.5 MG/1; MG/1
TABLET ORAL
Qty: 90 TABLET | Refills: 3 | Status: SHIPPED | OUTPATIENT
Start: 2021-08-02 | End: 2022-03-29

## 2021-10-28 RX ORDER — SIMVASTATIN 40 MG
TABLET ORAL
Qty: 90 TABLET | Refills: 3 | Status: SHIPPED | OUTPATIENT
Start: 2021-10-28

## 2021-11-15 DIAGNOSIS — N39.46 MIXED STRESS AND URGE URINARY INCONTINENCE: ICD-10-CM

## 2021-11-15 DIAGNOSIS — F41.8 DEPRESSION WITH ANXIETY: ICD-10-CM

## 2021-11-15 RX ORDER — TOLTERODINE 4 MG/1
CAPSULE, EXTENDED RELEASE ORAL
Qty: 90 CAPSULE | Refills: 3 | Status: SHIPPED | OUTPATIENT
Start: 2021-11-15 | End: 2022-03-29

## 2021-11-15 RX ORDER — FLUOXETINE HYDROCHLORIDE 40 MG/1
CAPSULE ORAL
Qty: 90 CAPSULE | Refills: 3 | Status: SHIPPED | OUTPATIENT
Start: 2021-11-15

## 2022-02-15 RX ORDER — LEVOTHYROXINE SODIUM 0.05 MG/1
TABLET ORAL
Qty: 90 TABLET | Refills: 3 | Status: SHIPPED | OUTPATIENT
Start: 2022-02-15

## 2022-03-29 ENCOUNTER — OFFICE VISIT (OUTPATIENT)
Dept: PRIMARY CARE CLINIC | Age: 82
End: 2022-03-29
Payer: MEDICARE

## 2022-03-29 VITALS
HEART RATE: 68 BPM | DIASTOLIC BLOOD PRESSURE: 84 MMHG | WEIGHT: 237.7 LBS | SYSTOLIC BLOOD PRESSURE: 132 MMHG | HEIGHT: 62 IN | BODY MASS INDEX: 43.74 KG/M2 | OXYGEN SATURATION: 94 %

## 2022-03-29 DIAGNOSIS — Z23 NEED FOR IMMUNIZATION AGAINST INFLUENZA: ICD-10-CM

## 2022-03-29 DIAGNOSIS — E78.5 HYPERLIPIDEMIA, UNSPECIFIED HYPERLIPIDEMIA TYPE: ICD-10-CM

## 2022-03-29 DIAGNOSIS — R60.0 LOCALIZED EDEMA: ICD-10-CM

## 2022-03-29 DIAGNOSIS — Z12.31 SCREENING MAMMOGRAM FOR BREAST CANCER: ICD-10-CM

## 2022-03-29 DIAGNOSIS — I10 ESSENTIAL HYPERTENSION: Primary | ICD-10-CM

## 2022-03-29 DIAGNOSIS — E66.01 OBESITY, CLASS III, BMI 40-49.9 (MORBID OBESITY) (HCC): ICD-10-CM

## 2022-03-29 DIAGNOSIS — N39.46 MIXED STRESS AND URGE URINARY INCONTINENCE: ICD-10-CM

## 2022-03-29 DIAGNOSIS — E03.9 ACQUIRED HYPOTHYROIDISM: ICD-10-CM

## 2022-03-29 PROCEDURE — G0008 ADMIN INFLUENZA VIRUS VAC: HCPCS | Performed by: NURSE PRACTITIONER

## 2022-03-29 PROCEDURE — 99214 OFFICE O/P EST MOD 30 MIN: CPT | Performed by: NURSE PRACTITIONER

## 2022-03-29 PROCEDURE — 90694 VACC AIIV4 NO PRSRV 0.5ML IM: CPT | Performed by: NURSE PRACTITIONER

## 2022-03-29 RX ORDER — TOLTERODINE 4 MG/1
4 CAPSULE, EXTENDED RELEASE ORAL 2 TIMES DAILY
Qty: 180 CAPSULE | Refills: 3 | Status: SHIPPED | OUTPATIENT
Start: 2022-03-29

## 2022-03-29 RX ORDER — LISINOPRIL AND HYDROCHLOROTHIAZIDE 25; 20 MG/1; MG/1
1 TABLET ORAL DAILY
Qty: 90 TABLET | Refills: 1 | Status: SHIPPED | OUTPATIENT
Start: 2022-03-29

## 2022-03-29 ASSESSMENT — ENCOUNTER SYMPTOMS
BACK PAIN: 1
RHINORRHEA: 0
NAUSEA: 1
VOMITING: 0
WHEEZING: 0
SHORTNESS OF BREATH: 0
COUGH: 0
EYE REDNESS: 0
ABDOMINAL PAIN: 0
DIARRHEA: 1
EYE DISCHARGE: 0
SORE THROAT: 0

## 2022-03-29 NOTE — PROGRESS NOTES
[address]  [facility]  [facility] [address] [place] [address]  Dept: [phone]    Lavelle Iyer is a 78 y.o. female Established patient, who presents today for her medical conditions/complaints as noted below. Chief Complaint   Patient presents with    Edema     Hands, knees, feet       HPI:     HPI  She needs blood test, mammogram,     She has had a cough and head congestion for a couple months. Joints hurt and are swollen. She is having a lot of shoulder pain but does not want surgery. She states they have been repaired once and she does not want to have the replaced. She saw Dr. Jj Love due to pain but no intervention at this point. She sees Dr. Leann Pedraza and gives her injections in the right foot and they are helpful.     Reviewed prior notes None  Reviewed previous      LDL Cholesterol (mg/dL)   Date Value   02/20/2020 97   04/27/2012 92       (goal LDL is <100)   AST (U/L)   Date Value   10/30/2020 17     ALT (U/L)   Date Value   10/30/2020 9     BUN (mg/dL)   Date Value   05/06/2021 41 (H)     TSH (mIU/L)   Date Value   10/30/2020 1.68     BP Readings from Last 3 Encounters:   03/29/22 132/84   05/06/21 136/84   11/10/20 136/70          (goal 120/80)    Past Medical History:   Diagnosis Date    Acute sinusitis     Anxiety     Arthritis     Attic perforation of tympanic membrane, right ear     Colitis     Cramp of limb     Depression     Difficulty breathing     Diverticulosis     Elbow injury     GERD (gastroesophageal reflux disease)     H/O acne     H/O acute bronchitis     H/O breast lump     H/O Clostridium difficile infection     H/O diarrhea     H/O diverticulitis of colon     H/O seborrheic keratosis     H/O: hypothyroidism     Hyperlipidemia     Hypertension     Hypothyroid     Joint pain, hip     OA (osteoarthritis)     Obesity     Polyuria     Shoulder injury     Urine, incontinence, stress female     Varicose vein     Vertigo       Past Surgical History:   Procedure Laterality Date    ABDOMEN SURGERY      laparotomy    APPENDECTOMY      BREAST LUMPECTOMY Left     BREAST LUMPECTOMY Left 5/15/14    CARPAL TUNNEL RELEASE Bilateral     CATARACT REMOVAL WITH IMPLANT Bilateral     CHOLECYSTECTOMY      COLONOSCOPY      COLONOSCOPY  14    diverticulosis, int. hemorroids    DILATION AND CURETTAGE OF UTERUS      ENDOSCOPY, COLON, DIAGNOSTIC      ENDOSCOPY, COLON, DIAGNOSTIC  14    polyps removed, Bx    ESOPHAGUS SURGERY      FOOT SURGERY Bilateral     bunion removal     FRACTURE SURGERY      nasal    HERNIA REPAIR      Hiatal    INCISIONAL HERNIA REPAIR  5/15/14    with periumbilical hernia repair with mesh    JOINT REPLACEMENT Bilateral     Hips    JOINT REPLACEMENT Bilateral     Knees    SHOULDER SURGERY Left     SINUS SURGERY      18 surgeries on sinuses    SPLENECTOMY      TONSILLECTOMY      TYMPANOSTOMY TUBE PLACEMENT Bilateral        Family History   Problem Relation Age of Onset    Hypertension Mother         MI,CAD    Stroke Mother     Hypertension Father         MI, CAD   [facility] Stroke Father     Other Brother         CAD       Social History     Tobacco Use    Smoking status: Former Smoker     Packs/day: 1.00     Years: 40.00     Pack years: 40.00     Types: Cigarettes     Quit date: 4/3/1999     Years since quittin.0    Smokeless tobacco: Never Used   Substance Use Topics    Alcohol use: Yes     Comment: socially       Current Outpatient Medications   Medication Sig Dispense Refill    tolterodine (DETROL LA) 4 MG extended release capsule Take 1 capsule by mouth 2 times daily Indications: Pt states it is no longer working - she is still taking it.  180 capsule 3    lisinopril-hydroCHLOROthiazide (PRINZIDE;ZESTORETIC) 20-25 MG per tablet Take 1 tablet by mouth daily 90 tablet 1    levothyroxine (SYNTHROID) 50 MCG tablet TAKE 1 TABLET DAILY 90 tablet 3    FLUoxetine (PROZAC) 40 MG capsule TAKE 1 CAPSULE DAILY 90 capsule 3    simvastatin (ZOCOR) 40 MG tablet TAKE 1 TABLET DAILY 90 tablet 3    esomeprazole (NEXIUM) 20 MG delayed release capsule TAKE 1 CAPSULE DAILY 90 capsule 3    colchicine (COLCRYS) 0.6 MG tablet Take 2 tabs immediately, then 1 tablet 1 hour later. 3 tablet 0     No current facility-administered medications for this visit. Allergies   Allergen Reactions    Doxycycline Other (See Comments)     dizzyness    Bactrim [Sulfamethoxazole-Trimethoprim]     Codeine     Penicillins     Percocet [Oxycodone-Acetaminophen]        Health Maintenance   Topic Date Due    Meningococcal (ACWY) vaccine (1 - Risk start 2-23 months series) Never done    Hib vaccine (1 of 1 - Risk 1-dose series) Never done    Meningococcal B vaccine (1 of 4 - Increased Risk Bexsero 2-dose series) Never done    DTaP/Tdap/Td vaccine (1 - Tdap) Never done    Shingles Vaccine (1 of 2) Never done    Lipid screen  02/20/2021    Flu vaccine (1) 09/01/2021    Annual Wellness Visit (AWV)  10/01/2021    TSH testing  10/30/2021    Depression Monitoring  05/06/2022    Potassium monitoring  05/06/2022    Creatinine monitoring  05/06/2022    DEXA (modify frequency per FRAX score)  Completed    Pneumococcal 65+ yrs at Risk Vaccine  Completed    COVID-19 Vaccine  Completed    Hepatitis A vaccine  Aged Out    Hepatitis B vaccine  Aged Out       Subjective:      Review of Systems   Constitutional: Negative for chills and fever. HENT: Negative for rhinorrhea and sore throat. Eyes: Negative for discharge and redness. Respiratory: Negative for cough, shortness of breath and wheezing. Cardiovascular: Positive for leg swelling. Negative for chest pain and palpitations. Gastrointestinal: Positive for diarrhea and nausea. Negative for abdominal pain and vomiting. Genitourinary: Negative for dysuria and frequency. Musculoskeletal: Positive for arthralgias, back pain and gait problem.  Negative for myalgias. Neurological: Positive for dizziness (when she has nausea and diarrhea) and light-headedness (when she gets up in the morning). Negative for headaches. Psychiatric/Behavioral: Negative for sleep disturbance. Objective:     /84   Pulse 68   Ht 5' 2.04\" (1.576 m)   Wt 237 lb 11.2 oz (107.8 kg)   SpO2 94%   BMI 43.42 kg/m²   Physical Exam  Vitals and nursing note reviewed. Constitutional:       General: She is not in acute distress. Appearance: She is well-developed. She is obese. She is not ill-appearing. HENT:      Head: Normocephalic and atraumatic. Right Ear: External ear normal.      Left Ear: External ear normal.   Eyes:      General: No scleral icterus. Right eye: No discharge. Left eye: No discharge. Conjunctiva/sclera: Conjunctivae normal.      Pupils: Pupils are equal, round, and reactive to light. Neck:      Thyroid: No thyromegaly. Trachea: No tracheal deviation. Cardiovascular:      Rate and Rhythm: Normal rate and regular rhythm. Heart sounds: Normal heart sounds. Comments: Minimal edema in wrist and ankles bilateral.  Pulmonary:      Effort: Pulmonary effort is normal. No respiratory distress. Breath sounds: Normal breath sounds. No wheezing. Abdominal:      General: Bowel sounds are normal.      Palpations: Abdomen is soft. Tenderness: There is no abdominal tenderness. Lymphadenopathy:      Cervical: No cervical adenopathy. Skin:     General: Skin is warm. Findings: No rash. Neurological:      Mental Status: She is alert and oriented to person, place, and time. Cranial Nerves: No cranial nerve deficit. Motor: No weakness. Psychiatric:         Mood and Affect: Mood normal.         Behavior: Behavior normal.         Thought Content: Thought content normal.         Assessment/Plan:   1.  Essential hypertension  -     lisinopril-hydroCHLOROthiazide (PRINZIDE;ZESTORETIC) 20-25 MG per tablet; Take 1 tablet by mouth daily, Disp-90 tablet, R-1Normal  -     CBC with Auto Differential; Future  2. Mixed stress and urge urinary incontinence  -     tolterodine (DETROL LA) 4 MG extended release capsule; Take 1 capsule by mouth 2 times daily Indications: Pt states it is no longer working - she is still taking it., Disp-180 capsule, R-3Normal  3. Localized edema  -     lisinopril-hydroCHLOROthiazide (PRINZIDE;ZESTORETIC) 20-25 MG per tablet; Take 1 tablet by mouth daily, Disp-90 tablet, R-1Normal  -     CBC with Auto Differential; Future  4. Acquired hypothyroidism  -     CBC with Auto Differential; Future  -     TSH; Future  -     T4, Free; Future  5. Hyperlipidemia, unspecified hyperlipidemia type  -     Lipid, Fasting; Future  -     Comprehensive Metabolic Panel; Future  6. Need for immunization against influenza  -     INFLUENZA, QUADV, ADJUVANTED, 72 YRS =, IM, PF, PREFILL SYR, 0.5ML (FLUAD)  7. Screening mammogram for breast cancer  -     [facility] DIGITAL SCREEN W OR WO CAD BILATERAL; Future  8. 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     Orders Placed This Encounter   Procedures    [facility] DIGITAL SCREEN W OR WO CAD BILATERAL     Standing Status:   Future     Standing Expiration Date:   5/29/2023     Order Specific Question:   Reason for exam:     Answer:   screening    INFLUENZA, QUADV, ADJUVANTED, 72 YRS =, IM, PF, PREFILL SYR, 0.5ML (FLUAD)    CBC with Auto Differential     Standing Status:   Future     Standing Expiration Date:   3/29/2023    Lipid, Fasting     Standing Status:   Future     Standing Expiration Date:   3/29/2023    Comprehensive Metabolic Panel     Standing Status:   Future     Standing Expiration Date:   3/29/2023    TSH     Standing Status:   Future     Standing Expiration Date:   3/29/2023    T4, Free     Standing Status:   Future     Standing Expiration Date:   3/29/2023     Orders Placed This Encounter   Medications    tolterodine (DETROL LA) 4 MG extended release capsule     Sig: Take 1 capsule by mouth 2 times daily Indications: Pt states it is no longer working - she is still taking it. Dispense:  180 capsule     Refill:  3    lisinopril-hydroCHLOROthiazide (PRINZIDE;ZESTORETIC) 20-25 MG per tablet     Sig: Take 1 tablet by mouth daily     Dispense:  90 tablet     Refill:  1       Patient given educational materials - see patient instructions. Discussed use, benefit, and side effects of prescribed medications. All patient questions answered. Pt voiced understanding. Reviewed health maintenance. Instructed to continue current medications, diet and exercise. Patient agreed with treatment plan. Follow up as directed.      Electronically signed by JON Guallpa CNP on 3/29/2022 at 10:45 AM

## 2022-05-16 DIAGNOSIS — K21.9 GASTROESOPHAGEAL REFLUX DISEASE WITHOUT ESOPHAGITIS: ICD-10-CM

## 2022-07-18 ENCOUNTER — HOSPITAL ENCOUNTER (EMERGENCY)
Age: 82
Discharge: HOME | End: 2022-07-19
Attending: EMERGENCY MEDICINE
Payer: MEDICARE

## 2022-07-18 ENCOUNTER — APPOINTMENT (OUTPATIENT)
Dept: GENERAL RADIOLOGY | Age: 82
End: 2022-07-18
Payer: MEDICARE

## 2022-07-18 ENCOUNTER — APPOINTMENT (OUTPATIENT)
Dept: CT IMAGING | Age: 82
End: 2022-07-18
Payer: MEDICARE

## 2022-07-18 VITALS
DIASTOLIC BLOOD PRESSURE: 81 MMHG | RESPIRATION RATE: 17 BRPM | SYSTOLIC BLOOD PRESSURE: 146 MMHG | HEART RATE: 58 BPM | OXYGEN SATURATION: 97 % | TEMPERATURE: 98 F
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
## 2022-07-18 DIAGNOSIS — M25.552 HIP PAIN, ACUTE, LEFT: ICD-10-CM

## 2022-07-18 LAB
ABSOLUTE EOS #: 0 K/UL (ref 0–0.4)
ABSOLUTE LYMPH #: 1.97 K/UL (ref 1–4.8)
ABSOLUTE MONO #: 0.59 K/UL (ref 0.1–1.3)
ALBUMIN SERPL-MCNC: 3.8 G/DL (ref 3.5–5.2)
ALP BLD-CCNC: 96 U/L (ref 35–104)
ALT SERPL-CCNC: 8 U/L (ref 5–33)
ANION GAP SERPL CALCULATED.3IONS-SCNC: 17 MMOL/L (ref 9–17)
AST SERPL-CCNC: 18 U/L
BASOPHILS # BLD: 1 % (ref 0–2)
BASOPHILS ABSOLUTE: 0.2 K/UL (ref 0–0.2)
BILIRUB SERPL-MCNC: 0.24 MG/DL (ref 0.3–1.2)
BUN BLDV-MCNC: 32 MG/DL (ref 8–23)
CALCIUM SERPL-MCNC: 9.4 MG/DL (ref 8.6–10.4)
CHLORIDE BLD-SCNC: 101 MMOL/L (ref 98–107)
CO2: 20 MMOL/L (ref 20–31)
CREAT SERPL-MCNC: 1.65 MG/DL (ref 0.5–0.9)
EOSINOPHILS RELATIVE PERCENT: 0 % (ref 0–4)
GFR AFRICAN AMERICAN: 36 ML/MIN
GFR NON-AFRICAN AMERICAN: 30 ML/MIN
GFR SERPL CREATININE-BSD FRML MDRD: ABNORMAL ML/MIN/{1.73_M2}
GLUCOSE BLD-MCNC: 174 MG/DL (ref 70–99)
HCT VFR BLD CALC: 45.9 % (ref 36–46)
HEMOGLOBIN: 14.5 G/DL (ref 12–16)
INR BLD: 1
LIPASE: 27 U/L (ref 13–60)
LYMPHOCYTES # BLD: 10 % (ref 24–44)
MAGNESIUM: 1.7 MG/DL (ref 1.6–2.6)
MCH RBC QN AUTO: 28.9 PG (ref 26–34)
MCHC RBC AUTO-ENTMCNC: 31.7 G/DL (ref 31–37)
MCV RBC AUTO: 91.1 FL (ref 80–100)
MONOCYTES # BLD: 3 % (ref 1–7)
MORPHOLOGY: ABNORMAL
PDW BLD-RTO: 14.6 % (ref 11.5–14.9)
PLATELET # BLD: 377 K/UL (ref 150–450)
PMV BLD AUTO: 9.3 FL (ref 6–12)
POTASSIUM SERPL-SCNC: 4.2 MMOL/L (ref 3.7–5.3)
PROTHROMBIN TIME: 13.3 SEC (ref 11.8–14.6)
RBC # BLD: 5.03 M/UL (ref 4–5.2)
SEG NEUTROPHILS: 86 % (ref 36–66)
SEGMENTED NEUTROPHILS ABSOLUTE COUNT: 16.94 K/UL (ref 1.3–9.1)
SODIUM BLD-SCNC: 138 MMOL/L (ref 135–144)
TOTAL CK: 112 U/L (ref 26–192)
TOTAL PROTEIN: 7.4 G/DL (ref 6.4–8.3)
TROPONIN, HIGH SENSITIVITY: 17 NG/L (ref 0–14)
TROPONIN, HIGH SENSITIVITY: 20 NG/L (ref 0–14)
WBC # BLD: 19.7 K/UL (ref 3.5–11)

## 2022-07-18 PROCEDURE — 80053 COMPREHEN METABOLIC PANEL: CPT

## 2022-07-18 PROCEDURE — 99285 EMERGENCY DEPT VISIT HI MDM: CPT

## 2022-07-18 PROCEDURE — 84484 ASSAY OF TROPONIN QUANT: CPT

## 2022-07-18 PROCEDURE — 85610 PROTHROMBIN TIME: CPT

## 2022-07-18 PROCEDURE — 93005 ELECTROCARDIOGRAM TRACING: CPT | Performed by: EMERGENCY MEDICINE

## 2022-07-18 PROCEDURE — 72125 CT NECK SPINE W/O DYE: CPT

## 2022-07-18 PROCEDURE — 73502 X-RAY EXAM HIP UNI 2-3 VIEWS: CPT

## 2022-07-18 PROCEDURE — 71250 CT THORAX DX C-: CPT

## 2022-07-18 PROCEDURE — 36415 COLL VENOUS BLD VENIPUNCTURE: CPT

## 2022-07-18 PROCEDURE — 71045 X-RAY EXAM CHEST 1 VIEW: CPT

## 2022-07-18 PROCEDURE — 83735 ASSAY OF MAGNESIUM: CPT

## 2022-07-18 PROCEDURE — 70450 CT HEAD/BRAIN W/O DYE: CPT

## 2022-07-18 PROCEDURE — 82550 ASSAY OF CK (CPK): CPT

## 2022-07-18 PROCEDURE — 85025 COMPLETE CBC W/AUTO DIFF WBC: CPT

## 2022-07-18 PROCEDURE — 2580000003 HC RX 258: Performed by: EMERGENCY MEDICINE

## 2022-07-18 PROCEDURE — 83690 ASSAY OF LIPASE: CPT

## 2022-07-18 RX ORDER — 0.9 % SODIUM CHLORIDE 0.9 %
1000 INTRAVENOUS SOLUTION INTRAVENOUS ONCE
Status: COMPLETED | OUTPATIENT
Start: 2022-07-18 | End: 2022-07-19

## 2022-07-18 RX ADMIN — SODIUM CHLORIDE 1000 ML: 9 INJECTION, SOLUTION INTRAVENOUS at 20:25

## 2022-07-18 ASSESSMENT — ENCOUNTER SYMPTOMS
ABDOMINAL PAIN: 0
EYE PAIN: 0
COLOR CHANGE: 0
SHORTNESS OF BREATH: 0
BACK PAIN: 0

## 2022-07-18 NOTE — ED PROVIDER NOTES
EMERGENCY DEPARTMENT ENCOUNTER    Pt Name: Rui Short  MRN: [id number]  [place] 1940  Date of evaluation: 7/18/22  CHIEF COMPLAINT       Chief Complaint   Patient presents with    Fall     HISTORY OF PRESENT ILLNESS   76-year-old female presents for complaints of reported fall. Patient reports that she was not feeling well earlier and was having abdominal pain, reports that she had an episode of diarrhea, states that after the episode of diarrhea she was having dizziness and sat on the toilet for a while till the dizziness passed, states that when she went to get up she leaned to the left in the toilet fell over, she reports that she fell onto her left hip, is complaining of left hip pain, she is unsure if she hit her head, also complaining of some neck pain, denies any chest pain, denies any current abdominal pain nausea or vomiting, denies any fevers or chills, currently denying any dizziness or lightheadedness. Patient denies any new numbness tingling weakness in the upper or lower extremities, denies any current blood thinner use. The history is provided by the patient. REVIEW OF SYSTEMS     Review of Systems   Constitutional:  Negative for fever. HENT:  Negative for congestion and ear pain. Eyes:  Negative for pain. Respiratory:  Negative for shortness of breath. Cardiovascular:  Negative for chest pain, palpitations and leg swelling. Gastrointestinal:  Negative for abdominal pain. Genitourinary:  Negative for dysuria and flank pain. Musculoskeletal:  Negative for back pain. Left Hip Pain   Skin:  Negative for color change. Neurological:  Negative for numbness and headaches. Psychiatric/Behavioral:  Negative for confusion. All other systems reviewed and are negative.   PASTMEDICAL HISTORY     Past Medical History:   Diagnosis Date    Acute sinusitis     Anxiety     Arthritis     Attic perforation of tympanic membrane, right ear     Colitis     Cramp of limb Depression     Difficulty breathing     Diverticulosis     Elbow injury     GERD (gastroesophageal reflux disease)     H/O acne     H/O acute bronchitis     H/O breast lump     H/O Clostridium difficile infection     H/O diarrhea     H/O diverticulitis of colon     H/O seborrheic keratosis     H/O: hypothyroidism     Hyperlipidemia     Hypertension     Hypothyroid     Joint pain, hip     OA (osteoarthritis)     Obesity     Polyuria     Shoulder injury     Urine, incontinence, stress female     Varicose vein     Vertigo      Past Problem List  Patient Active Problem List   Diagnosis Code    Constipation K59.00    Depression with anxiety F41.8    Esophageal reflux K21.9    Hyperlipidemia E78.5    Hypertension I10    Impingement syndrome of right shoulder M75.41    Joint pain, knee M25.569    Lumbar radiculopathy M54.16    Morbid obesity ([address] E66.01    Osteoarthritis M19.90    Renal insufficiency N28.9    Urinary incontinence R32    Localized edema R60.0    Acquired hypothyroidism E03.9     SURGICAL HISTORY       Past Surgical History:   Procedure Laterality Date    ABDOMEN SURGERY      laparotomy    APPENDECTOMY      BREAST LUMPECTOMY Left     BREAST LUMPECTOMY Left 5/15/14    CARPAL TUNNEL RELEASE Bilateral     CATARACT REMOVAL WITH IMPLANT Bilateral     CHOLECYSTECTOMY      COLONOSCOPY      COLONOSCOPY  4-29-14    diverticulosis, int. hemorroids    DILATION AND CURETTAGE OF UTERUS      ENDOSCOPY, COLON, DIAGNOSTIC      ENDOSCOPY, COLON, DIAGNOSTIC  4-29-14    polyps removed, Bx    ESOPHAGUS SURGERY      FOOT SURGERY Bilateral     bunion removal     FRACTURE SURGERY      nasal    HERNIA REPAIR      Hiatal    INCISIONAL HERNIA REPAIR  5/15/14    with periumbilical hernia repair with mesh    JOINT REPLACEMENT Bilateral     Hips    JOINT REPLACEMENT Bilateral     Knees    SHOULDER SURGERY Left     SINUS SURGERY      18 surgeries on sinuses    SPLENECTOMY      TONSILLECTOMY      TYMPANOSTOMY TUBE PLACEMENT Bilateral CURRENT MEDICATIONS       Discharge Medication List as of 2022  2:51 AM        CONTINUE these medications which have NOT CHANGED    Details   esomeprazole (NEXIUM) 20 MG delayed release capsule TAKE 1 CAPSULE DAILY, Disp-90 capsule, R-3Normal      tolterodine (DETROL LA) 4 MG extended release capsule Take 1 capsule by mouth 2 times daily Indications: Pt states it is no longer working - she is still taking it., Disp-180 capsule, R-3Normal      lisinopril-hydroCHLOROthiazide (PRINZIDE;ZESTORETIC) 20-25 MG per tablet Take 1 tablet by mouth daily, Disp-90 tablet, R-1Normal      levothyroxine (SYNTHROID) 50 MCG tablet TAKE 1 TABLET DAILY, Disp-90 tablet, R-3Normal      FLUoxetine (PROZAC) 40 MG capsule TAKE 1 CAPSULE DAILY, Disp-90 capsule, R-3Normal      simvastatin (ZOCOR) 40 MG tablet TAKE 1 TABLET DAILY, Disp-90 tablet, R-3Normal      colchicine (COLCRYS) 0.6 MG tablet Take 2 tabs immediately, then 1 tablet 1 hour later. , Disp-3 tablet,R-0Normal           ALLERGIES     is allergic to doxycycline, bactrim [sulfamethoxazole-trimethoprim], codeine, penicillins, and percocet [oxycodone-acetaminophen]. FAMILY HISTORY     She indicated that her mother is . She indicated that her father is . She indicated that the status of her brother is unknown. SOCIAL HISTORY       Social History     Tobacco Use    Smoking status: Former     Packs/day: 1.00     Years: 40.00     Pack years: 40.00     Types: Cigarettes     Quit date: 4/3/1999     Years since quittin.3    Smokeless tobacco: Never   Vaping Use    Vaping Use: Never used   Substance Use Topics    Alcohol use: Yes     Comment: socially     Drug use: No     PHYSICAL EXAM     INITIAL VITALS: BP (!) 146/81   Pulse 58   Temp 98 °F (36.7 °C)   Resp 17   SpO2 97%    Physical Exam  Vitals and nursing note reviewed. Constitutional:       General: She is not in acute distress. Appearance: Normal appearance. She is not toxic-appearing.    HENT: Head: Normocephalic and atraumatic. Nose: Nose normal.      Mouth/Throat:      Mouth: Mucous membranes are moist.      Pharynx: Oropharynx is clear. Eyes:      Extraocular Movements: Extraocular movements intact. Conjunctiva/sclera: Conjunctivae normal.      Pupils: Pupils are equal, round, and reactive to light. Cardiovascular:      Rate and Rhythm: Normal rate and regular rhythm. Pulses: Normal pulses. Radial pulses are 2+ on the right side and 2+ on the left side. Dorsalis pedis pulses are 2+ on the right side and 2+ on the left side. Heart sounds: Normal heart sounds. Pulmonary:      Effort: Pulmonary effort is normal.      Breath sounds: Normal breath sounds. Abdominal:      General: Bowel sounds are normal. There is no distension. Palpations: Abdomen is soft. Tenderness: There is abdominal tenderness in the suprapubic area and left lower quadrant. Musculoskeletal:         General: Normal range of motion. Cervical back: Full passive range of motion without pain, normal range of motion and neck supple. Spinous process tenderness and muscular tenderness present. Left hip: Tenderness present. No deformity, lacerations, bony tenderness or crepitus. Normal range of motion. Normal strength. Skin:     General: Skin is warm and dry. Capillary Refill: Capillary refill takes less than 2 seconds. Neurological:      General: No focal deficit present. Mental Status: She is alert and oriented to person, place, and time. Cranial Nerves: Cranial nerves are intact. Sensory: Sensation is intact. Motor: Motor function is intact. Psychiatric:         Mood and Affect: Mood normal.         Thought Content: Thought content does not include homicidal or suicidal ideation. MEDICAL DECISION MAKIN-year-old female presents for complaints of reported fall.   On initial exam patient in no acute distress vitals are stable, GCS of this unenhanced CT study. CT CSpine W/O Contrast   Final Result   No evidence of acute intracranial process. Mild atrophy and chronic small   vessel ischemic changes noted. Moderate multilevel cervical spine degenerative changes. No acute fracture   or subluxation. CT Head W/O Contrast   Final Result   No evidence of acute intracranial process. Mild atrophy and chronic small   vessel ischemic changes noted. Moderate multilevel cervical spine degenerative changes. No acute fracture   or subluxation. XR CHEST PORTABLE   Final Result   No acute cardiopulmonary disease. No evidence of an acute injury. XR HIP 2-3 VW W PELVIS LEFT   Final Result   Left hip arthroplasty with no evidence of an acute fracture or dislocation. LABS: All lab results were reviewed by myself, and all abnormals are listed below.   Labs Reviewed   CBC WITH AUTO DIFFERENTIAL - Abnormal; Notable for the following components:       Result Value    WBC 19.7 (*)     Seg Neutrophils 86 (*)     Lymphocytes 10 (*)     Segs Absolute 16.94 (*)     All other components within normal limits   COMPREHENSIVE METABOLIC PANEL - Abnormal; Notable for the following components:    Glucose 174 (*)     BUN 32 (*)     CREATININE 1.65 (*)     Total Bilirubin 0.24 (*)     GFR Non- 30 (*)     GFR  36 (*)     All other components within normal limits   TROPONIN - Abnormal; Notable for the following components:    Troponin, High Sensitivity 20 (*)     All other components within normal limits   TROPONIN - Abnormal; Notable for the following components:    Troponin, High Sensitivity 17 (*)     All other components within normal limits   URINALYSIS WITH REFLEX TO CULTURE - Abnormal; Notable for the following components:    Turbidity UA Cloudy (*)     Glucose, Ur TRACE (*)     Protein, UA 2+ (*)     All other components within normal limits   MAGNESIUM   LIPASE   PROTIME-INR   CK MICROSCOPIC URINALYSIS       EMERGENCY DEPARTMENTCOURSE:         Vitals:    Vitals:    07/18/22 2129 07/18/22 2139 07/18/22 2149 07/18/22 2159   BP: 100/81  128/83 (!) 146/81   Pulse: 56 56 56 58   Resp: 14 18 16 17   Temp:       SpO2: 98% 97% 98% 97%       The patient was given the following medications while in the emergency department:  Orders Placed This Encounter   Medications    0.9 % sodium chloride bolus     CONSULTS:  None    FINAL IMPRESSION      1. Fall, initial encounter    2. Hip pain, acute, left          DISPOSITION/PLAN   DISPOSITION Decision To Discharge 07/19/2022 02:23:31 AM      PATIENT REFERRED TO:  [facility]. [address] [place] [address]  [phone]    Schedule an appointment as soon as possible for a visit       [facility] ED  [place] [id number] [phone]    As needed, If symptoms worsen  DISCHARGE MEDICATIONS:  Discharge Medication List as of 7/19/2022  2:51 AM        The care is provided during an unprecedented national emergency due to the novel coronavirus, COVID 19.   [address], DO                     [address], DO  07/19/22 1960

## 2022-07-19 LAB
-: NORMAL
BILIRUBIN URINE: NEGATIVE
CASTS UA: NORMAL /LPF
COLOR: YELLOW
EKG ATRIAL RATE: 77 BPM
EKG P-R INTERVAL: 118 MS
EKG Q-T INTERVAL: 442 MS
EKG QRS DURATION: 128 MS
EKG QTC CALCULATION (BAZETT): 500 MS
EKG R AXIS: -54 DEGREES
EKG T AXIS: 43 DEGREES
EKG VENTRICULAR RATE: 77 BPM
GLUCOSE URINE: ABNORMAL
KETONES, URINE: NEGATIVE
LEUKOCYTE ESTERASE, URINE: NEGATIVE
NITRITE, URINE: NEGATIVE
PH UA: 5 (ref 5–8)
PROTEIN UA: ABNORMAL
RBC UA: NORMAL /HPF
SPECIFIC GRAVITY UA: 1.01 (ref 1–1.03)
TURBIDITY: ABNORMAL
URINE HGB: NEGATIVE
UROBILINOGEN, URINE: NORMAL
WBC UA: NORMAL /HPF

## 2022-07-19 PROCEDURE — 81001 URINALYSIS AUTO W/SCOPE: CPT

## 2022-07-19 PROCEDURE — 93010 ELECTROCARDIOGRAM REPORT: CPT | Performed by: INTERNAL MEDICINE

## 2022-07-19 NOTE — ED NOTES
Mode of arrival (squad #, walk in, police, etc) : EMS      Chief complaint(s): Fall      Arrival Note (brief scenario, treatment PTA, etc). : Pt arrived via EMS after a fall at home. EMS states toilet broke under her after a episode of diarrhea. Pt was approx on ground for 1 hour before found. Pt denies hitting head but is complaining of left sided hip pain stating that is the side she laid on. Pt arrived to ED covered in contents that was in toiled. C= \"Have you ever felt that you should Cut down on your drinking? \"  No  A= \"Have people Annoyed you by criticizing your drinking? \"  No  G= \"Have you ever felt bad or Guilty about your drinking? \"  No  E= \"Have you ever had a drink as an Eye-opener first thing in the morning to steady your nerves or to help a hangover? \"  No      Deferred []      Reason for deferring: N/A    *If yes to two or more: probable alcohol abuse. Ngozi Whittaker RN  07/18/22 2027       Radhames An RN  07/18/22 2127

## 2022-08-19 ENCOUNTER — HOSPITAL ENCOUNTER (OUTPATIENT)
Dept: WOMENS IMAGING | Age: 82
Discharge: HOME | End: 2022-08-21
Payer: MEDICARE

## 2022-08-19 DIAGNOSIS — Z12.31 SCREENING MAMMOGRAM FOR BREAST CANCER: ICD-10-CM

## 2022-08-19 PROCEDURE — 77063 BREAST TOMOSYNTHESIS BI: CPT

## 2022-11-18 ENCOUNTER — OFFICE VISIT (OUTPATIENT)
Dept: PRIMARY CARE CLINIC | Age: 82
End: 2022-11-18

## 2022-11-18 VITALS
BODY MASS INDEX: 43.61 KG/M2 | HEIGHT: 62 IN | DIASTOLIC BLOOD PRESSURE: 88 MMHG | HEART RATE: 71 BPM | OXYGEN SATURATION: 96 % | SYSTOLIC BLOOD PRESSURE: 134 MMHG | WEIGHT: 237 LBS

## 2022-11-18 DIAGNOSIS — R60.0 LOCALIZED EDEMA: ICD-10-CM

## 2022-11-18 DIAGNOSIS — Z00.00 PHYSICAL EXAM, ANNUAL: ICD-10-CM

## 2022-11-18 DIAGNOSIS — F41.8 DEPRESSION WITH ANXIETY: ICD-10-CM

## 2022-11-18 DIAGNOSIS — I10 ESSENTIAL HYPERTENSION: Primary | ICD-10-CM

## 2022-11-18 DIAGNOSIS — N39.46 MIXED STRESS AND URGE URINARY INCONTINENCE: ICD-10-CM

## 2022-11-18 DIAGNOSIS — E03.9 ACQUIRED HYPOTHYROIDISM: ICD-10-CM

## 2022-11-18 DIAGNOSIS — K21.9 GASTROESOPHAGEAL REFLUX DISEASE WITHOUT ESOPHAGITIS: ICD-10-CM

## 2022-11-18 DIAGNOSIS — Z23 NEED FOR VACCINATION: ICD-10-CM

## 2022-11-18 DIAGNOSIS — Z13.6 ENCOUNTER FOR LIPID SCREENING FOR CARDIOVASCULAR DISEASE: ICD-10-CM

## 2022-11-18 DIAGNOSIS — Z13.220 ENCOUNTER FOR LIPID SCREENING FOR CARDIOVASCULAR DISEASE: ICD-10-CM

## 2022-11-18 PROCEDURE — 90471 IMMUNIZATION ADMIN: CPT | Performed by: FAMILY MEDICINE

## 2022-11-18 PROCEDURE — 99213 OFFICE O/P EST LOW 20 MIN: CPT | Performed by: FAMILY MEDICINE

## 2022-11-18 PROCEDURE — 3078F DIAST BP <80 MM HG: CPT | Performed by: FAMILY MEDICINE

## 2022-11-18 PROCEDURE — 3074F SYST BP LT 130 MM HG: CPT | Performed by: FAMILY MEDICINE

## 2022-11-18 PROCEDURE — 1123F ACP DISCUSS/DSCN MKR DOCD: CPT | Performed by: FAMILY MEDICINE

## 2022-11-18 PROCEDURE — 90694 VACC AIIV4 NO PRSRV 0.5ML IM: CPT | Performed by: FAMILY MEDICINE

## 2022-11-18 RX ORDER — SIMVASTATIN 40 MG
40 TABLET ORAL NIGHTLY
Qty: 90 TABLET | Refills: 3 | Status: SHIPPED | OUTPATIENT
Start: 2022-11-18

## 2022-11-18 RX ORDER — LEVOTHYROXINE SODIUM 0.05 MG/1
50 TABLET ORAL DAILY
Qty: 90 TABLET | Refills: 3 | Status: SHIPPED | OUTPATIENT
Start: 2022-11-18

## 2022-11-18 RX ORDER — TOLTERODINE 4 MG/1
4 CAPSULE, EXTENDED RELEASE ORAL DAILY
Qty: 90 CAPSULE | Refills: 3 | Status: SHIPPED | OUTPATIENT
Start: 2022-11-18

## 2022-11-18 RX ORDER — LISINOPRIL AND HYDROCHLOROTHIAZIDE 25; 20 MG/1; MG/1
1 TABLET ORAL DAILY
Qty: 90 TABLET | Refills: 3 | Status: SHIPPED | OUTPATIENT
Start: 2022-11-18

## 2022-11-18 RX ORDER — FLUOXETINE HYDROCHLORIDE 40 MG/1
40 CAPSULE ORAL DAILY
Qty: 90 CAPSULE | Refills: 3 | Status: SHIPPED | OUTPATIENT
Start: 2022-11-18

## 2022-11-18 SDOH — ECONOMIC STABILITY: FOOD INSECURITY: WITHIN THE PAST 12 MONTHS, YOU WORRIED THAT YOUR FOOD WOULD RUN OUT BEFORE YOU GOT MONEY TO BUY MORE.: NEVER TRUE

## 2022-11-18 SDOH — ECONOMIC STABILITY: FOOD INSECURITY: WITHIN THE PAST 12 MONTHS, THE FOOD YOU BOUGHT JUST DIDN'T LAST AND YOU DIDN'T HAVE MONEY TO GET MORE.: NEVER TRUE

## 2022-11-18 ASSESSMENT — PATIENT HEALTH QUESTIONNAIRE - PHQ9
9. THOUGHTS THAT YOU WOULD BE BETTER OFF DEAD, OR OF HURTING YOURSELF: 0
1. LITTLE INTEREST OR PLEASURE IN DOING THINGS: 1
7. TROUBLE CONCENTRATING ON THINGS, SUCH AS READING THE NEWSPAPER OR WATCHING TELEVISION: 0
SUM OF ALL RESPONSES TO PHQ QUESTIONS 1-9: 6
SUM OF ALL RESPONSES TO PHQ9 QUESTIONS 1 & 2: 2
SUM OF ALL RESPONSES TO PHQ QUESTIONS 1-9: 6
2. FEELING DOWN, DEPRESSED OR HOPELESS: 1
5. POOR APPETITE OR OVEREATING: 1
SUM OF ALL RESPONSES TO PHQ QUESTIONS 1-9: 6
6. FEELING BAD ABOUT YOURSELF - OR THAT YOU ARE A FAILURE OR HAVE LET YOURSELF OR YOUR FAMILY DOWN: 0
8. MOVING OR SPEAKING SO SLOWLY THAT OTHER PEOPLE COULD HAVE NOTICED. OR THE OPPOSITE, BEING SO FIGETY OR RESTLESS THAT YOU HAVE BEEN MOVING AROUND A LOT MORE THAN USUAL: 1
4. FEELING TIRED OR HAVING LITTLE ENERGY: 1
10. IF YOU CHECKED OFF ANY PROBLEMS, HOW DIFFICULT HAVE THESE PROBLEMS MADE IT FOR YOU TO DO YOUR WORK, TAKE CARE OF THINGS AT HOME, OR GET ALONG WITH OTHER PEOPLE: 0
SUM OF ALL RESPONSES TO PHQ QUESTIONS 1-9: 6
3. TROUBLE FALLING OR STAYING ASLEEP: 1

## 2022-11-18 ASSESSMENT — SOCIAL DETERMINANTS OF HEALTH (SDOH): HOW HARD IS IT FOR YOU TO PAY FOR THE VERY BASICS LIKE FOOD, HOUSING, MEDICAL CARE, AND HEATING?: NOT HARD AT ALL

## 2022-11-18 ASSESSMENT — ENCOUNTER SYMPTOMS
VOMITING: 0
NAUSEA: 0
WHEEZING: 0
EYE DISCHARGE: 0
RHINORRHEA: 0
COUGH: 0
ABDOMINAL PAIN: 0
DIARRHEA: 0
SHORTNESS OF BREATH: 0
SORE THROAT: 0
EYE REDNESS: 0

## 2022-11-18 NOTE — PROGRESS NOTES
[address]  PRIMARY CARE  [address]  [facility] [place] [address]  Dept: [phone]    Elvis Henderson is a 78 y.o. female Established patient, who presents today for her medical conditions/complaints as noted below. Chief Complaint   Patient presents with    Depression    Hypertension       HPI:     HPI  Patient here for follow-up. States not checking blood pressures outside the office. Denies any chest pain or shortness of breath. No lightheadedness or dizziness. Patient states does have her urge incontinence. Wanting to get back on her Detrol. States her prescription ran out. Patient does complain of diffuse osteoarthritis pain. States gets it in the hands and in the joints. Patient uses Tylenol without relief. Advised patient with her renal function being down however cannot use anti-inflammatories. Patient states her mood is been doing well. No thoughts of hurting self or others.   Is somewhat down with loss of her sister-in-law to dementia    Reviewed prior notes None  Reviewed previous Labs    LDL Cholesterol (mg/dL)   Date Value   02/20/2020 97   04/27/2012 92       (goal LDL is <100)   AST (U/L)   Date Value   07/18/2022 18     ALT (U/L)   Date Value   07/18/2022 8     BUN (mg/dL)   Date Value   07/18/2022 32 (H)     TSH (mIU/L)   Date Value   10/30/2020 1.68     BP Readings from Last 3 Encounters:   11/18/22 134/88   07/18/22 (!) 146/81   03/29/22 132/84          (goal 120/80)    Past Medical History:   Diagnosis Date    Acute sinusitis     Anxiety     Arthritis     Attic perforation of tympanic membrane, right ear     Colitis     Cramp of limb     Depression     Difficulty breathing     Diverticulosis     Elbow injury     GERD (gastroesophageal reflux disease)     H/O acne     H/O acute bronchitis     H/O breast lump     H/O Clostridium difficile infection     H/O diarrhea     H/O diverticulitis of colon     H/O seborrheic keratosis     H/O: hypothyroidism     Hyperlipidemia     Hypertension     Hypothyroid     Joint pain, hip     OA (osteoarthritis)     Obesity     Polyuria     Shoulder injury     Urine, incontinence, stress female     Varicose vein     Vertigo       Past Surgical History:   Procedure Laterality Date    ABDOMEN SURGERY      laparotomy    APPENDECTOMY      BREAST LUMPECTOMY Left     BREAST LUMPECTOMY Left 5/15/14    CARPAL TUNNEL RELEASE Bilateral     CATARACT REMOVAL WITH IMPLANT Bilateral     CHOLECYSTECTOMY      COLONOSCOPY      COLONOSCOPY  14    diverticulosis, int. hemorroids    DILATION AND CURETTAGE OF UTERUS      ENDOSCOPY, COLON, DIAGNOSTIC      ENDOSCOPY, COLON, DIAGNOSTIC  14    polyps removed, Bx    ESOPHAGUS SURGERY      FOOT SURGERY Bilateral     bunion removal     FRACTURE SURGERY      nasal    HERNIA REPAIR      Hiatal    INCISIONAL HERNIA REPAIR  5/15/14    with periumbilical hernia repair with mesh    JOINT REPLACEMENT Bilateral     Hips    JOINT REPLACEMENT Bilateral     Knees    SHOULDER SURGERY Left     SINUS SURGERY      18 surgeries on sinuses    SPLENECTOMY      TONSILLECTOMY      TYMPANOSTOMY TUBE PLACEMENT Bilateral        Family History   Problem Relation Age of Onset    Hypertension Mother         MI,CAD    Stroke Mother     Hypertension Father         MI, CAD    Stroke Father     Other Brother         CAD       Social History     Tobacco Use    Smoking status: Former     Packs/day: 1.00     Years: 40.00     Pack years: 40.00     Types: Cigarettes     Quit date: 4/3/1999     Years since quittin.6    Smokeless tobacco: Never   Substance Use Topics    Alcohol use: Yes     Comment: socially       Current Outpatient Medications   Medication Sig Dispense Refill    tolterodine (DETROL LA) 4 MG extended release capsule Take 1 capsule by mouth daily Indications: Pt states it is no longer working - she is still taking it.  90 capsule 3    esomeprazole (NEXIUM) 20 MG delayed release capsule Take 1 capsule by mouth every morning (before breakfast) 90 capsule 3    lisinopril-hydroCHLOROthiazide (PRINZIDE;ZESTORETIC) 20-25 MG per tablet Take 1 tablet by mouth daily 90 tablet 3    levothyroxine (SYNTHROID) 50 MCG tablet Take 1 tablet by mouth Daily 90 tablet 3    FLUoxetine (PROZAC) 40 MG capsule Take 1 capsule by mouth daily 90 capsule 3    simvastatin (ZOCOR) 40 MG tablet Take 1 tablet by mouth nightly 90 tablet 3    colchicine (COLCRYS) 0.6 MG tablet Take 2 tabs immediately, then 1 tablet 1 hour later. 3 tablet 0     No current facility-administered medications for this visit. Allergies   Allergen Reactions    Doxycycline Other (See Comments)     dizzyness    Bactrim [Sulfamethoxazole-Trimethoprim]     Codeine     Penicillins     Percocet [Oxycodone-Acetaminophen]        Health Maintenance   Topic Date Due    Meningococcal (ACWY) vaccine (1 - Risk start 2-23 months series) Never done    Hib vaccine (1 of 1 - Risk 1-dose series) Never done    Meningococcal B vaccine (1 of 4 - Increased Risk Bexsero 2-dose series) Never done    DTaP/Tdap/Td vaccine (1 - Tdap) Never done    Shingles vaccine (1 of 2) Never done    Lipids  02/20/2021    Depression Monitoring  05/06/2022    Flu vaccine (1) 08/01/2022    COVID-19 Vaccine (5 - Booster for Pfizer series) 10/12/2022    DEXA (modify frequency per FRAX score)  Completed    Pneumococcal 65+ years Vaccine  Completed    Hepatitis A vaccine  Aged Out       Subjective:      Review of Systems   Constitutional:  Negative for chills and fever. HENT:  Negative for rhinorrhea and sore throat. Eyes:  Negative for discharge and redness. Respiratory:  Negative for cough, shortness of breath and wheezing. Cardiovascular:  Negative for chest pain and palpitations. Gastrointestinal:  Negative for abdominal pain, diarrhea, nausea and vomiting. Genitourinary:  Negative for dysuria and frequency. Musculoskeletal:  Positive for arthralgias. Negative for myalgias. Neurological:  Negative for dizziness, light-headedness and headaches. Psychiatric/Behavioral:  Negative for sleep disturbance. Objective:     /88   Pulse 71   Ht 5' 2.04\" (1.576 m)   Wt 237 lb (107.5 kg)   SpO2 96%   BMI 43.29 kg/m²   Physical Exam  Vitals and nursing note reviewed. Constitutional:       General: She is not in acute distress. Appearance: She is well-developed. She is not ill-appearing. HENT:      Head: Normocephalic and atraumatic. Right Ear: External ear normal.      Left Ear: External ear normal.   Eyes:      General: No scleral icterus. Right eye: No discharge. Left eye: No discharge. Conjunctiva/sclera: Conjunctivae normal.   Neck:      Thyroid: No thyromegaly. Trachea: No tracheal deviation. Cardiovascular:      Rate and Rhythm: Normal rate and regular rhythm. Heart sounds: Normal heart sounds. Pulmonary:      Effort: Pulmonary effort is normal. No respiratory distress. Breath sounds: Normal breath sounds. No wheezing. Lymphadenopathy:      Cervical: No cervical adenopathy. Skin:     General: Skin is warm. Findings: No rash. Neurological:      Mental Status: She is alert and oriented to person, place, and time. Psychiatric:         Mood and Affect: Mood normal.         Behavior: Behavior normal.         Thought Content: Thought content normal.       Assessment:       Diagnosis Orders   1. Essential hypertension  lisinopril-hydroCHLOROthiazide (PRINZIDE;ZESTORETIC) 20-25 MG per tablet      2. Need for vaccination  Influenza, FLUAD, (age 72 y+), IM, Preservative Free, 0.5 mL      3. Mixed stress and urge urinary incontinence  tolterodine (DETROL LA) 4 MG extended release capsule      4. Gastroesophageal reflux disease without esophagitis  esomeprazole (NEXIUM) 20 MG delayed release capsule      5. Localized edema  lisinopril-hydroCHLOROthiazide (PRINZIDE;ZESTORETIC) 20-25 MG per tablet      6.  Depression with anxiety  FLUoxetine (PROZAC) 40 MG capsule      7. Encounter for lipid screening for cardiovascular disease  Lipid, Fasting      8. Physical exam, annual  Basic Metabolic Panel, Fasting    Hepatic Function Panel      9. Acquired hypothyroidism  T4, Free    TSH             Plan:   Renew medications  Flu shot today  Blood work ordered  Advised patient cannot prescribe anti-inflammatories due to renal insufficiency    Return in about 6 months (around 5/18/2023), or Medicare wellness. Orders Placed This Encounter   Procedures    Influenza, FLUAD, (age 72 y+), IM, Preservative Free, 0.5 mL    T4, Free     Standing Status:   Future     Standing Expiration Date:   11/18/2023    TSH     Standing Status:   Future     Standing Expiration Date:   11/18/2023    Lipid, Fasting     Standing Status:   Future     Standing Expiration Date:   45/44/9539    Basic Metabolic Panel, Fasting     Standing Status:   Future     Standing Expiration Date:   11/18/2023    Hepatic Function Panel     Standing Status:   Future     Standing Expiration Date:   11/18/2023     Orders Placed This Encounter   Medications    tolterodine (DETROL LA) 4 MG extended release capsule     Sig: Take 1 capsule by mouth daily Indications: Pt states it is no longer working - she is still taking it.      Dispense:  90 capsule     Refill:  3    esomeprazole (NEXIUM) 20 MG delayed release capsule     Sig: Take 1 capsule by mouth every morning (before breakfast)     Dispense:  90 capsule     Refill:  3    lisinopril-hydroCHLOROthiazide (PRINZIDE;ZESTORETIC) 20-25 MG per tablet     Sig: Take 1 tablet by mouth daily     Dispense:  90 tablet     Refill:  3    levothyroxine (SYNTHROID) 50 MCG tablet     Sig: Take 1 tablet by mouth Daily     Dispense:  90 tablet     Refill:  3    FLUoxetine (PROZAC) 40 MG capsule     Sig: Take 1 capsule by mouth daily     Dispense:  90 capsule     Refill:  3    simvastatin (ZOCOR) 40 MG tablet     Sig: Take 1 tablet by mouth nightly Dispense:  90 tablet     Refill:  3       Patient given educational materials - see patient instructions. Discussed use, benefit, and side effects of prescribed medications. All patient questions answered. Pt voiced understanding. Reviewed health maintenance. Instructed to continue current medications, diet andexercise. Patient agreed with treatment plan. Follow up as directed.      Electronicallysigned by Georgina Travis MD on 11/18/2022 at 12:34 PM

## 2023-05-16 ENCOUNTER — HOSPITAL ENCOUNTER (OUTPATIENT)
Age: 83
Discharge: HOME | End: 2023-05-16
Payer: MEDICARE

## 2023-05-16 DIAGNOSIS — E03.9 ACQUIRED HYPOTHYROIDISM: ICD-10-CM

## 2023-05-16 DIAGNOSIS — Z00.00 PHYSICAL EXAM, ANNUAL: ICD-10-CM

## 2023-05-16 DIAGNOSIS — Z13.6 ENCOUNTER FOR LIPID SCREENING FOR CARDIOVASCULAR DISEASE: ICD-10-CM

## 2023-05-16 DIAGNOSIS — Z13.220 ENCOUNTER FOR LIPID SCREENING FOR CARDIOVASCULAR DISEASE: ICD-10-CM

## 2023-05-16 LAB
ALBUMIN SERPL-MCNC: 3.9 G/DL (ref 3.5–5.2)
ALP SERPL-CCNC: 106 U/L (ref 35–104)
ALT SERPL-CCNC: 12 U/L (ref 5–33)
ANION GAP SERPL CALCULATED.3IONS-SCNC: 12 MMOL/L (ref 9–17)
AST SERPL-CCNC: 24 U/L
BILIRUB DIRECT SERPL-MCNC: 0.2 MG/DL
BILIRUB INDIRECT SERPL-MCNC: 0.1 MG/DL (ref 0–1)
BILIRUB SERPL-MCNC: 0.3 MG/DL (ref 0.3–1.2)
BUN SERPL-MCNC: 39 MG/DL (ref 8–23)
CALCIUM SERPL-MCNC: 9.5 MG/DL (ref 8.6–10.4)
CHLORIDE SERPL-SCNC: 103 MMOL/L (ref 98–107)
CHOLEST SERPL-MCNC: 160 MG/DL
CHOLESTEROL/HDL RATIO: 2.7
CO2 SERPL-SCNC: 26 MMOL/L (ref 20–31)
CREAT SERPL-MCNC: 1.61 MG/DL (ref 0.5–0.9)
GFR SERPL CREATININE-BSD FRML MDRD: 31 ML/MIN/1.73M2
GLUCOSE P FAST SERPL-MCNC: 139 MG/DL (ref 70–99)
HDLC SERPL-MCNC: 60 MG/DL
LDLC SERPL CALC-MCNC: 86 MG/DL (ref 0–130)
POTASSIUM SERPL-SCNC: 4.2 MMOL/L (ref 3.7–5.3)
PROT SERPL-MCNC: 8 G/DL (ref 6.4–8.3)
SODIUM SERPL-SCNC: 141 MMOL/L (ref 135–144)
T4 FREE SERPL-MCNC: 1 NG/DL (ref 0.9–1.7)
TRIGL SERPL-MCNC: 70 MG/DL
TSH SERPL-ACNC: 1.66 UIU/ML (ref 0.3–5)

## 2023-05-16 PROCEDURE — 84439 ASSAY OF FREE THYROXINE: CPT

## 2023-05-16 PROCEDURE — 80061 LIPID PANEL: CPT

## 2023-05-16 PROCEDURE — 80076 HEPATIC FUNCTION PANEL: CPT

## 2023-05-16 PROCEDURE — 36415 COLL VENOUS BLD VENIPUNCTURE: CPT

## 2023-05-16 PROCEDURE — 80048 BASIC METABOLIC PNL TOTAL CA: CPT

## 2023-05-16 PROCEDURE — 84443 ASSAY THYROID STIM HORMONE: CPT

## 2023-05-19 ENCOUNTER — OFFICE VISIT (OUTPATIENT)
Dept: PRIMARY CARE CLINIC | Age: 83
End: 2023-05-19
Payer: MEDICARE

## 2023-05-19 VITALS
WEIGHT: 234.7 LBS | BODY MASS INDEX: 43.19 KG/M2 | OXYGEN SATURATION: 96 % | HEART RATE: 62 BPM | DIASTOLIC BLOOD PRESSURE: 70 MMHG | HEIGHT: 62 IN | SYSTOLIC BLOOD PRESSURE: 130 MMHG

## 2023-05-19 DIAGNOSIS — N39.46 MIXED STRESS AND URGE URINARY INCONTINENCE: ICD-10-CM

## 2023-05-19 DIAGNOSIS — N18.32 STAGE 3B CHRONIC KIDNEY DISEASE (HCC): ICD-10-CM

## 2023-05-19 DIAGNOSIS — Z00.00 MEDICARE ANNUAL WELLNESS VISIT, SUBSEQUENT: Primary | ICD-10-CM

## 2023-05-19 DIAGNOSIS — R73.9 HYPERGLYCEMIA: ICD-10-CM

## 2023-05-19 LAB — HBA1C MFR BLD: 5.8 %

## 2023-05-19 PROCEDURE — 3078F DIAST BP <80 MM HG: CPT | Performed by: FAMILY MEDICINE

## 2023-05-19 PROCEDURE — 3075F SYST BP GE 130 - 139MM HG: CPT | Performed by: FAMILY MEDICINE

## 2023-05-19 PROCEDURE — 83036 HEMOGLOBIN GLYCOSYLATED A1C: CPT | Performed by: FAMILY MEDICINE

## 2023-05-19 PROCEDURE — 1123F ACP DISCUSS/DSCN MKR DOCD: CPT | Performed by: FAMILY MEDICINE

## 2023-05-19 PROCEDURE — G0439 PPPS, SUBSEQ VISIT: HCPCS | Performed by: FAMILY MEDICINE

## 2023-05-19 RX ORDER — TOLTERODINE 4 MG/1
4 CAPSULE, EXTENDED RELEASE ORAL DAILY
Qty: 90 CAPSULE | Refills: 3 | Status: SHIPPED | OUTPATIENT
Start: 2023-05-19

## 2023-05-19 SDOH — ECONOMIC STABILITY: HOUSING INSECURITY
IN THE LAST 12 MONTHS, WAS THERE A TIME WHEN YOU DID NOT HAVE A STEADY PLACE TO SLEEP OR SLEPT IN A SHELTER (INCLUDING NOW)?: NO

## 2023-05-19 SDOH — ECONOMIC STABILITY: FOOD INSECURITY: WITHIN THE PAST 12 MONTHS, THE FOOD YOU BOUGHT JUST DIDN'T LAST AND YOU DIDN'T HAVE MONEY TO GET MORE.: NEVER TRUE

## 2023-05-19 SDOH — ECONOMIC STABILITY: INCOME INSECURITY: HOW HARD IS IT FOR YOU TO PAY FOR THE VERY BASICS LIKE FOOD, HOUSING, MEDICAL CARE, AND HEATING?: NOT HARD AT ALL

## 2023-05-19 SDOH — ECONOMIC STABILITY: FOOD INSECURITY: WITHIN THE PAST 12 MONTHS, YOU WORRIED THAT YOUR FOOD WOULD RUN OUT BEFORE YOU GOT MONEY TO BUY MORE.: NEVER TRUE

## 2023-05-19 ASSESSMENT — PATIENT HEALTH QUESTIONNAIRE - PHQ9
3. TROUBLE FALLING OR STAYING ASLEEP: 0
1. LITTLE INTEREST OR PLEASURE IN DOING THINGS: 1
7. TROUBLE CONCENTRATING ON THINGS, SUCH AS READING THE NEWSPAPER OR WATCHING TELEVISION: 0
6. FEELING BAD ABOUT YOURSELF - OR THAT YOU ARE A FAILURE OR HAVE LET YOURSELF OR YOUR FAMILY DOWN: 0
SUM OF ALL RESPONSES TO PHQ QUESTIONS 1-9: 2
2. FEELING DOWN, DEPRESSED OR HOPELESS: 1
SUM OF ALL RESPONSES TO PHQ9 QUESTIONS 1 & 2: 2
SUM OF ALL RESPONSES TO PHQ QUESTIONS 1-9: 2
9. THOUGHTS THAT YOU WOULD BE BETTER OFF DEAD, OR OF HURTING YOURSELF: 0
SUM OF ALL RESPONSES TO PHQ QUESTIONS 1-9: 2
SUM OF ALL RESPONSES TO PHQ QUESTIONS 1-9: 2
5. POOR APPETITE OR OVEREATING: 0
8. MOVING OR SPEAKING SO SLOWLY THAT OTHER PEOPLE COULD HAVE NOTICED. OR THE OPPOSITE, BEING SO FIGETY OR RESTLESS THAT YOU HAVE BEEN MOVING AROUND A LOT MORE THAN USUAL: 0
4. FEELING TIRED OR HAVING LITTLE ENERGY: 0

## 2023-05-19 ASSESSMENT — LIFESTYLE VARIABLES
HOW OFTEN DO YOU HAVE A DRINK CONTAINING ALCOHOL: MONTHLY OR LESS
HOW MANY STANDARD DRINKS CONTAINING ALCOHOL DO YOU HAVE ON A TYPICAL DAY: 1 OR 2

## 2023-05-19 NOTE — PROGRESS NOTES
Activity: Inactive    Days of Exercise per Week: 0 days    Minutes of Exercise per Session: 0 min     On average, how many days per week do you engage in moderate to strenuous exercise (like a brisk walk)?: 0 days  Have you lost any weight without trying in the past 3 months?: No  Body mass index is 42.87 kg/m². (!) Abnormal    Inactivity Interventions:  Patient declined any further interventions or treatment  Obesity Interventions:  Patient declines any further evaluation or treatment          Dentist Screen:  Have you seen the dentist within the past year?: (!) No    Intervention:  Not applicable - dentures    Hearing Screen:  Do you or your family notice any trouble with your hearing that hasn't been managed with hearing aids?: (!) Yes    Interventions:  Patient declines any further evaluation or treatment                       Objective   Vitals:    05/19/23 1138   BP: 130/70   Pulse: 62   SpO2: 96%   Weight: 234 lb 11.2 oz (106.5 kg)   Height: 5' 2.04\" (1.576 m)      Body mass index is 42.87 kg/m².       General Appearance: alert and oriented to person, place and time, well developed and well- nourished, in no acute distress  Skin: warm and dry, no rash or erythema  Head: normocephalic and atraumatic  Eyes: pupils equal, round, and reactive to light, extraocular eye movements intact, conjunctivae normal  ENT: tympanic membrane, external ear and ear canal normal bilaterally, nose without deformity, nasal mucosa and turbinates normal without polyps  Neck: supple and non-tender without mass, no thyromegaly or thyroid nodules, no cervical lymphadenopathy  Pulmonary/Chest: clear to auscultation bilaterally- no wheezes, rales or rhonchi, normal air movement, no respiratory distress  Cardiovascular: normal rate, regular rhythm, normal S1 and S2, no murmurs, rubs, clicks, or gallops, distal pulses intact, no carotid bruits  Abdomen: soft, non-tender, non-distended, normal bowel sounds, no masses or

## 2023-05-19 NOTE — PATIENT INSTRUCTIONS
protects against both UVA and UVB radiation with an SPF of 30 or greater. Reapply every 2 to 3 hours or after sweating, drying off with a towel, or swimming. Always wear a seat belt when traveling in a car. Always wear a helmet when riding a bicycle or motorcycle.

## 2023-07-10 ENCOUNTER — OFFICE VISIT (OUTPATIENT)
Dept: PRIMARY CARE CLINIC | Age: 83
End: 2023-07-10
Payer: MEDICARE

## 2023-07-10 VITALS
HEIGHT: 62 IN | SYSTOLIC BLOOD PRESSURE: 132 MMHG | BODY MASS INDEX: 43.24 KG/M2 | DIASTOLIC BLOOD PRESSURE: 70 MMHG | OXYGEN SATURATION: 97 % | HEART RATE: 60 BPM | WEIGHT: 235 LBS

## 2023-07-10 DIAGNOSIS — N39.46 MIXED STRESS AND URGE URINARY INCONTINENCE: ICD-10-CM

## 2023-07-10 DIAGNOSIS — B86 SCABIES: ICD-10-CM

## 2023-07-10 DIAGNOSIS — L29.9 GENERALIZED PRURITUS: Primary | ICD-10-CM

## 2023-07-10 DIAGNOSIS — I10 PRIMARY HYPERTENSION: ICD-10-CM

## 2023-07-10 PROCEDURE — 3075F SYST BP GE 130 - 139MM HG: CPT | Performed by: FAMILY MEDICINE

## 2023-07-10 PROCEDURE — 3078F DIAST BP <80 MM HG: CPT | Performed by: FAMILY MEDICINE

## 2023-07-10 PROCEDURE — 99213 OFFICE O/P EST LOW 20 MIN: CPT | Performed by: FAMILY MEDICINE

## 2023-07-10 PROCEDURE — 1123F ACP DISCUSS/DSCN MKR DOCD: CPT | Performed by: FAMILY MEDICINE

## 2023-07-10 RX ORDER — HYDROXYZINE HYDROCHLORIDE 25 MG/1
25 TABLET, FILM COATED ORAL EVERY 8 HOURS PRN
Qty: 60 TABLET | Refills: 0 | Status: SHIPPED | OUTPATIENT
Start: 2023-07-10 | End: 2023-07-30

## 2023-07-10 RX ORDER — PERMETHRIN 50 MG/G
CREAM TOPICAL
Qty: 2 EACH | Refills: 0 | Status: SHIPPED | OUTPATIENT
Start: 2023-07-10

## 2023-07-10 RX ORDER — TOLTERODINE 4 MG/1
4 CAPSULE, EXTENDED RELEASE ORAL DAILY
Qty: 30 CAPSULE | Refills: 5 | Status: SHIPPED | OUTPATIENT
Start: 2023-07-10

## 2023-07-10 ASSESSMENT — ENCOUNTER SYMPTOMS
VOMITING: 0
DIARRHEA: 0
WHEEZING: 0
SORE THROAT: 0
SHORTNESS OF BREATH: 0
NAUSEA: 0
ABDOMINAL PAIN: 0
RHINORRHEA: 0
COUGH: 0
EYE DISCHARGE: 0
EYE REDNESS: 0

## 2023-07-10 NOTE — PROGRESS NOTES
colon     H/O seborrheic keratosis     H/O: hypothyroidism     Hyperlipidemia     Hypertension     Hypothyroid     Joint pain, hip     OA (osteoarthritis)     Obesity     Polyuria     Shoulder injury     Urine, incontinence, stress female     Varicose vein     Vertigo       Past Surgical History:   Procedure Laterality Date    ABDOMEN SURGERY      laparotomy    APPENDECTOMY      BREAST LUMPECTOMY Left     BREAST LUMPECTOMY Left 5/15/14    CARPAL TUNNEL RELEASE Bilateral     CATARACT REMOVAL WITH IMPLANT Bilateral     CHOLECYSTECTOMY      COLONOSCOPY      COLONOSCOPY  14    diverticulosis, int. hemorroids    DILATION AND CURETTAGE OF UTERUS      ENDOSCOPY, COLON, DIAGNOSTIC      ENDOSCOPY, COLON, DIAGNOSTIC  14    polyps removed, Bx    ESOPHAGUS SURGERY      FOOT SURGERY Bilateral     bunion removal     FRACTURE SURGERY      nasal    HERNIA REPAIR      Hiatal    INCISIONAL HERNIA REPAIR  5/15/14    with periumbilical hernia repair with mesh    JOINT REPLACEMENT Bilateral     Hips    JOINT REPLACEMENT Bilateral     Knees    SHOULDER SURGERY Left     SINUS SURGERY      18 surgeries on sinuses    SPLENECTOMY      TONSILLECTOMY      TYMPANOSTOMY TUBE PLACEMENT Bilateral        Family History   Problem Relation Age of Onset    Hypertension Mother         MI,CAD    Stroke Mother     Hypertension Father         MI, CAD    Stroke Father     Other Brother         CAD       Social History     Tobacco Use    Smoking status: Former     Packs/day: 1.00     Years: 40.00     Pack years: 40.00     Types: Cigarettes     Quit date: 4/3/1999     Years since quittin.2    Smokeless tobacco: Never   Substance Use Topics    Alcohol use: Yes     Comment: socially       Current Outpatient Medications   Medication Sig Dispense Refill    permethrin (ELIMITE) 5 % cream Apply neck down at night, rinse of next am.  Repeat in one week 2 each 0    hydrOXYzine HCl (ATARAX) 25 MG tablet Take 1 tablet by mouth every 8 hours as needed

## 2023-09-11 ENCOUNTER — HOSPITAL ENCOUNTER (EMERGENCY)
Age: 83
Discharge: HOME | End: 2023-09-11
Attending: STUDENT IN AN ORGANIZED HEALTH CARE EDUCATION/TRAINING PROGRAM
Payer: MEDICARE

## 2023-09-11 ENCOUNTER — APPOINTMENT (OUTPATIENT)
Dept: GENERAL RADIOLOGY | Age: 83
End: 2023-09-11
Payer: MEDICARE

## 2023-09-11 VITALS
DIASTOLIC BLOOD PRESSURE: 92 MMHG | TEMPERATURE: 98.4 F | SYSTOLIC BLOOD PRESSURE: 112 MMHG | HEART RATE: 51 BPM | BODY MASS INDEX: 42.33 KG/M2 | HEIGHT: 62 IN | WEIGHT: 230 LBS | OXYGEN SATURATION: 97 % | RESPIRATION RATE: 12 BRPM

## 2023-09-11 DIAGNOSIS — M25.512 ACUTE PAIN OF LEFT SHOULDER DUE TO TRAUMA: Primary | ICD-10-CM

## 2023-09-11 DIAGNOSIS — G89.11 ACUTE PAIN OF LEFT SHOULDER DUE TO TRAUMA: Primary | ICD-10-CM

## 2023-09-11 PROCEDURE — 73030 X-RAY EXAM OF SHOULDER: CPT

## 2023-09-11 PROCEDURE — 6370000000 HC RX 637 (ALT 250 FOR IP): Performed by: PHYSICIAN ASSISTANT

## 2023-09-11 PROCEDURE — 99283 EMERGENCY DEPT VISIT LOW MDM: CPT

## 2023-09-11 RX ORDER — ACETAMINOPHEN 500 MG
1000 TABLET ORAL ONCE
Status: COMPLETED | OUTPATIENT
Start: 2023-09-11 | End: 2023-09-11

## 2023-09-11 RX ORDER — ACETAMINOPHEN 325 MG/1
650 TABLET ORAL EVERY 6 HOURS PRN
Qty: 60 TABLET | Refills: 0 | Status: SHIPPED | OUTPATIENT
Start: 2023-09-11

## 2023-09-11 RX ADMIN — ACETAMINOPHEN 1000 MG: 500 TABLET ORAL at 12:35

## 2023-09-11 ASSESSMENT — PAIN - FUNCTIONAL ASSESSMENT: PAIN_FUNCTIONAL_ASSESSMENT: 0-10

## 2023-09-11 ASSESSMENT — LIFESTYLE VARIABLES
HOW MANY STANDARD DRINKS CONTAINING ALCOHOL DO YOU HAVE ON A TYPICAL DAY: 1 OR 2
HOW OFTEN DO YOU HAVE A DRINK CONTAINING ALCOHOL: MONTHLY OR LESS

## 2023-09-11 ASSESSMENT — PAIN DESCRIPTION - ORIENTATION: ORIENTATION: LEFT

## 2023-09-11 ASSESSMENT — PAIN SCALES - GENERAL: PAINLEVEL_OUTOF10: 8

## 2023-09-11 ASSESSMENT — PAIN DESCRIPTION - LOCATION: LOCATION: ARM;SHOULDER

## 2023-09-11 NOTE — DISCHARGE INSTRUCTIONS
Use the shoulder sling for support. Avoid lifting things with the injured arm. You may take tylenol for relief as needed. You may apply an ice pack to the injured shoulder for up to 20 minutes at time as needed. Follow up with Dr. Miguel A Land as soon as possible.

## 2023-09-11 NOTE — ED TRIAGE NOTES
Mode of arrival (squad #, walk in, police, etc) : Azar Chiang        Chief complaint(s): Fall, Shoulder pain, Arm pain        Arrival Note (brief scenario, treatment PTA, etc). : Pt reports falling last night no LOC or hitting head. Pt AMA'd for EMS last night but pain became unbearable today. Pt reports arm pain 8/10. Pt is A&Ox4.        C= \"Have you ever felt that you should Cut down on your drinking? \"  No  A= \"Have people Annoyed you by criticizing your drinking? \"  No  G= \"Have you ever felt bad or Guilty about your drinking? \"  No  E= \"Have you ever had a drink as an Eye-opener first thing in the morning to steady your nerves or to help a hangover? \"  No      Deferred []      Reason for deferring: N/A    *If yes to two or more: probable alcohol abuse. *

## 2023-09-25 ENCOUNTER — OFFICE VISIT (OUTPATIENT)
Dept: ORTHOPEDIC SURGERY | Age: 83
End: 2023-09-25

## 2023-09-25 VITALS — HEIGHT: 62 IN | WEIGHT: 230 LBS | RESPIRATION RATE: 14 BRPM | BODY MASS INDEX: 42.33 KG/M2

## 2023-09-25 DIAGNOSIS — M25.512 LEFT SHOULDER PAIN, UNSPECIFIED CHRONICITY: Primary | ICD-10-CM

## 2023-09-25 RX ORDER — LIDOCAINE HYDROCHLORIDE 10 MG/ML
3 INJECTION, SOLUTION INFILTRATION; PERINEURAL ONCE
Status: COMPLETED | OUTPATIENT
Start: 2023-09-25 | End: 2023-09-25

## 2023-09-25 RX ORDER — TRIAMCINOLONE ACETONIDE 40 MG/ML
40 INJECTION, SUSPENSION INTRA-ARTICULAR; INTRAMUSCULAR ONCE
Status: COMPLETED | OUTPATIENT
Start: 2023-09-25 | End: 2023-09-25

## 2023-09-25 RX ADMIN — LIDOCAINE HYDROCHLORIDE 3 ML: 10 INJECTION, SOLUTION INFILTRATION; PERINEURAL at 16:38

## 2023-09-25 RX ADMIN — TRIAMCINOLONE ACETONIDE 40 MG: 40 INJECTION, SUSPENSION INTRA-ARTICULAR; INTRAMUSCULAR at 16:38

## 2023-09-25 NOTE — PROGRESS NOTES
ORTHOPEDIC PATIENT EVALUATION      HPI / Chief Complaint  Mandi Lance is a 78 y.o. female who presents for evaluation of her left shoulder. I've  seen her in her past and she has been diagnosed with moderate glenohumeral joint osteoarthritis in addition to her recurrent rotator cuff tear. 2 weeks ago she indicates that she slipped and fell on a wet floor in her utility room landing on and hurting her left shoulder. She was seen in emergency department the next day and presents today for further evaluation and treatment. Her pain is primarily localized to the lateral aspect of her shoulder. She has limited ability to raise this arm as a result of pain. Past Medical History  Javier Scott  has a past medical history of Acute sinusitis, Anxiety, Arthritis, Attic perforation of tympanic membrane, right ear, Colitis, Cramp of limb, Depression, Difficulty breathing, Diverticulosis, Elbow injury, GERD (gastroesophageal reflux disease), H/O acne, H/O acute bronchitis, H/O breast lump, H/O Clostridium difficile infection, H/O diarrhea, H/O diverticulitis of colon, H/O seborrheic keratosis, H/O: hypothyroidism, Hyperlipidemia, Hypertension, Hypothyroid, Joint pain, hip, OA (osteoarthritis), Obesity, Polyuria, Shoulder injury, Urine, incontinence, stress female, Varicose vein, and Vertigo. Past Surgical History  Javier Scott  has a past surgical history that includes sinus surgery; Splenectomy; Esophagus surgery; Foot surgery (Bilateral); Cataract removal with implant (Bilateral); Tonsillectomy; Dilation and curettage of uterus; Tympanostomy tube placement (Bilateral); Abdomen surgery; Breast lumpectomy (Left); fracture surgery; hernia repair; Appendectomy; Carpal tunnel release (Bilateral); joint replacement (Bilateral); joint replacement (Bilateral); shoulder surgery (Left); Endoscopy, colon, diagnostic; Endoscopy, colon, diagnostic (4-29-14); Colonoscopy; Colonoscopy (4-29-14); Incisional hernia repair (5/15/14);  Breast

## 2023-11-24 ENCOUNTER — OFFICE VISIT (OUTPATIENT)
Dept: PRIMARY CARE CLINIC | Age: 83
End: 2023-11-24
Payer: MEDICARE

## 2023-11-24 VITALS
OXYGEN SATURATION: 97 % | HEART RATE: 62 BPM | SYSTOLIC BLOOD PRESSURE: 126 MMHG | WEIGHT: 244 LBS | BODY MASS INDEX: 44.9 KG/M2 | HEIGHT: 62 IN | DIASTOLIC BLOOD PRESSURE: 74 MMHG

## 2023-11-24 DIAGNOSIS — E03.9 ACQUIRED HYPOTHYROIDISM: ICD-10-CM

## 2023-11-24 DIAGNOSIS — N39.46 MIXED STRESS AND URGE URINARY INCONTINENCE: ICD-10-CM

## 2023-11-24 DIAGNOSIS — F41.8 DEPRESSION WITH ANXIETY: ICD-10-CM

## 2023-11-24 DIAGNOSIS — R29.6 FREQUENT FALLS: Primary | ICD-10-CM

## 2023-11-24 DIAGNOSIS — R60.0 LOCALIZED EDEMA: ICD-10-CM

## 2023-11-24 DIAGNOSIS — Z23 NEED FOR VACCINATION: ICD-10-CM

## 2023-11-24 DIAGNOSIS — E78.5 HYPERLIPIDEMIA, UNSPECIFIED HYPERLIPIDEMIA TYPE: ICD-10-CM

## 2023-11-24 DIAGNOSIS — I10 PRIMARY HYPERTENSION: ICD-10-CM

## 2023-11-24 DIAGNOSIS — I10 ESSENTIAL HYPERTENSION: ICD-10-CM

## 2023-11-24 PROCEDURE — 1123F ACP DISCUSS/DSCN MKR DOCD: CPT | Performed by: FAMILY MEDICINE

## 2023-11-24 PROCEDURE — 90694 VACC AIIV4 NO PRSRV 0.5ML IM: CPT | Performed by: FAMILY MEDICINE

## 2023-11-24 PROCEDURE — 3074F SYST BP LT 130 MM HG: CPT | Performed by: FAMILY MEDICINE

## 2023-11-24 PROCEDURE — 3078F DIAST BP <80 MM HG: CPT | Performed by: FAMILY MEDICINE

## 2023-11-24 PROCEDURE — 99214 OFFICE O/P EST MOD 30 MIN: CPT | Performed by: FAMILY MEDICINE

## 2023-11-24 PROCEDURE — G0008 ADMIN INFLUENZA VIRUS VAC: HCPCS | Performed by: FAMILY MEDICINE

## 2023-11-24 RX ORDER — LEVOTHYROXINE SODIUM 0.05 MG/1
50 TABLET ORAL DAILY
Qty: 90 TABLET | Refills: 3 | Status: SHIPPED | OUTPATIENT
Start: 2023-11-24

## 2023-11-24 RX ORDER — SIMVASTATIN 40 MG
40 TABLET ORAL NIGHTLY
Qty: 90 TABLET | Refills: 3 | Status: SHIPPED | OUTPATIENT
Start: 2023-11-24

## 2023-11-24 RX ORDER — LISINOPRIL AND HYDROCHLOROTHIAZIDE 25; 20 MG/1; MG/1
1 TABLET ORAL DAILY
Qty: 90 TABLET | Refills: 3 | Status: SHIPPED | OUTPATIENT
Start: 2023-11-24

## 2023-11-24 RX ORDER — TOLTERODINE 4 MG/1
4 CAPSULE, EXTENDED RELEASE ORAL 2 TIMES DAILY
Qty: 180 CAPSULE | Refills: 1 | Status: SHIPPED | OUTPATIENT
Start: 2023-11-24 | End: 2023-11-24

## 2023-11-24 RX ORDER — FLUOXETINE HYDROCHLORIDE 40 MG/1
40 CAPSULE ORAL DAILY
Qty: 90 CAPSULE | Refills: 3 | Status: SHIPPED | OUTPATIENT
Start: 2023-11-24

## 2023-11-24 RX ORDER — TOLTERODINE 4 MG/1
4 CAPSULE, EXTENDED RELEASE ORAL 2 TIMES DAILY
Qty: 180 CAPSULE | Refills: 1 | Status: SHIPPED | OUTPATIENT
Start: 2023-11-24

## 2023-11-24 ASSESSMENT — ENCOUNTER SYMPTOMS
NAUSEA: 0
VOMITING: 0
EYE DISCHARGE: 0
WHEEZING: 0
DIARRHEA: 0
ABDOMINAL PAIN: 0
COUGH: 0
RHINORRHEA: 0
SORE THROAT: 0
EYE REDNESS: 0
SHORTNESS OF BREATH: 0

## 2023-11-24 NOTE — PROGRESS NOTES
[address] [facility] PRIMARY CARE  [facility]  [address] [address]  Dept: [phone]    Thedford Apgar is a 78 y.o. female Established patient, who presents today for her medical conditions/complaints as noted below. Chief Complaint   Patient presents with    Depression    Anxiety    Back Pain    Fall    Immunizations     Boostrix, Shingrix- will get at pharmacy       HPI:     HPI  Patient here with complaint of frequent falls. States that September injuring her shoulder. States getting weaker. Using a walker. Patient states moods been doing okay    Patient continues to have issues with incontinence. Wondering if she can increase her Detrol to twice a day. Patient understands risk of dry mouth constipation and lightheadedness    Patient denies any melena or hematochezia. No fevers or chills. Otherwise unremarkable.     Reviewed prior notes None  Reviewed previous Labs    LDL Cholesterol (mg/dL)   Date Value   2023 86   2020 97   2012 92       (goal LDL is <100)   AST (U/L)   Date Value   2023 24     ALT (U/L)   Date Value   2023 12     BUN (mg/dL)   Date Value   2023 39 (H)     Hemoglobin A1C (%)   Date Value   2023 5.8     TSH (uIU/mL)   Date Value   2023 1.66     BP Readings from Last 3 Encounters:   23 126/74   23 (!) 112/92   07/10/23 132/70          (goal 120/80)    Past Medical History:   Diagnosis Date    Acute sinusitis     Anxiety     Arthritis     Attic perforation of tympanic membrane, right ear     Colitis     Cramp of limb     Depression     Difficulty breathing     Diverticulosis     Elbow injury     GERD (gastroesophageal reflux disease)     H/O acne     H/O acute bronchitis     H/O breast lump     H/O Clostridium difficile infection     H/O diarrhea     H/O diverticulitis of colon     H/O seborrheic keratosis     H/O: hypothyroidism     Hyperlipidemia     Hypertension     Hypothyroid

## 2023-11-27 ENCOUNTER — TELEPHONE (OUTPATIENT)
Dept: PRIMARY CARE CLINIC | Age: 83
End: 2023-11-27

## 2023-11-27 DIAGNOSIS — N39.46 MIXED STRESS AND URGE URINARY INCONTINENCE: ICD-10-CM

## 2023-11-27 RX ORDER — TOLTERODINE 4 MG/1
8 CAPSULE, EXTENDED RELEASE ORAL DAILY
Qty: 180 CAPSULE | Refills: 1 | Status: SHIPPED | OUTPATIENT
Start: 2023-11-27

## 2023-11-27 NOTE — TELEPHONE ENCOUNTER
Karol Lawrence on [address] sent clarification request for Tolterodine ER 4 mg. Cap. Per pharmacy this is a 24 hour capsule, not given BID.

## 2024-01-03 ENCOUNTER — HOSPITAL ENCOUNTER (OUTPATIENT)
Age: 84
Discharge: HOME | End: 2024-01-03
Payer: MEDICARE

## 2024-01-03 DIAGNOSIS — I10 PRIMARY HYPERTENSION: ICD-10-CM

## 2024-01-03 DIAGNOSIS — E03.9 ACQUIRED HYPOTHYROIDISM: ICD-10-CM

## 2024-01-03 LAB
ANION GAP SERPL CALCULATED.3IONS-SCNC: 12 MMOL/L (ref 9–17)
BUN SERPL-MCNC: 30 MG/DL (ref 8–23)
CALCIUM SERPL-MCNC: 8.9 MG/DL (ref 8.6–10.4)
CHLORIDE SERPL-SCNC: 105 MMOL/L (ref 98–107)
CO2 SERPL-SCNC: 23 MMOL/L (ref 20–31)
CREAT SERPL-MCNC: 1.4 MG/DL (ref 0.5–0.9)
GFR SERPL CREATININE-BSD FRML MDRD: 37 ML/MIN/1.73M2
GLUCOSE SERPL-MCNC: 109 MG/DL (ref 70–99)
POTASSIUM SERPL-SCNC: 4.3 MMOL/L (ref 3.7–5.3)
SODIUM SERPL-SCNC: 140 MMOL/L (ref 135–144)
T4 FREE SERPL-MCNC: 1.2 NG/DL (ref 0.9–1.7)
TSH SERPL DL<=0.05 MIU/L-ACNC: 1.56 UIU/ML (ref 0.3–5)

## 2024-01-03 PROCEDURE — 84443 ASSAY THYROID STIM HORMONE: CPT

## 2024-01-03 PROCEDURE — 80048 BASIC METABOLIC PNL TOTAL CA: CPT

## 2024-01-03 PROCEDURE — 84439 ASSAY OF FREE THYROXINE: CPT

## 2024-01-03 PROCEDURE — 36415 COLL VENOUS BLD VENIPUNCTURE: CPT

## 2024-02-19 DIAGNOSIS — N39.46 MIXED STRESS AND URGE URINARY INCONTINENCE: ICD-10-CM

## 2024-02-19 RX ORDER — TOLTERODINE 4 MG/1
4 CAPSULE, EXTENDED RELEASE ORAL DAILY
Qty: 30 CAPSULE | Refills: 0 | Status: SHIPPED | OUTPATIENT
Start: 2024-02-19

## 2024-02-19 NOTE — TELEPHONE ENCOUNTER
LAST VISIT:   11/24/2023     Future Appointments   Date Time Provider Department Center   5/24/2024 11:15 AM Gavin Whitlock MD [facility]       Pharmacy verified? Yes       [facility] [address] [place] [address] -  [phone] - F [phone]  [facility] [address]  Phone: [phone] Fax: [phone]

## 2024-04-03 NOTE — LETTER
5/26/2020    Jace Hammonds MD  [address] [facility], [address] [place] [address]    RE: Prateek Perdomo    Dear Dr. Dia Villela,    Thank you for allowing me to participate in the care of Ms. Amanda Ramos. I had the opportunity to evaluate the patient on 5/26/2020. Attached you will find my evaluation and recommendations. Thanks again for the confidence you have expressed in me by allowing my participation in the care of your patient. I will keep you apprised of further developments in the patients treatment course as it progresses. If I can be of further assistance in any fashion, please feel free to contact me at your convenience.     Sincerely,        Irwin Hyatt  Shoulder and Elbow Surgery
Detail Level: Simple
Detail Level: Detailed
Detail Level: Zone

## 2024-04-17 DIAGNOSIS — N39.46 MIXED STRESS AND URGE URINARY INCONTINENCE: ICD-10-CM

## 2024-04-18 RX ORDER — TOLTERODINE 4 MG/1
4 CAPSULE, EXTENDED RELEASE ORAL DAILY
Qty: 30 CAPSULE | Refills: 0 | Status: SHIPPED | OUTPATIENT
Start: 2024-04-18

## 2024-06-24 DIAGNOSIS — N39.46 MIXED STRESS AND URGE URINARY INCONTINENCE: ICD-10-CM

## 2024-06-24 RX ORDER — TOLTERODINE 4 MG/1
4 CAPSULE, EXTENDED RELEASE ORAL DAILY
Qty: 30 CAPSULE | Refills: 0 | Status: SHIPPED | OUTPATIENT
Start: 2024-06-24

## 2024-07-16 ENCOUNTER — OFFICE VISIT (OUTPATIENT)
Dept: PRIMARY CARE CLINIC | Age: 84
End: 2024-07-16
Payer: MEDICARE

## 2024-07-16 VITALS
WEIGHT: 227.4 LBS | DIASTOLIC BLOOD PRESSURE: 70 MMHG | HEIGHT: 62 IN | HEART RATE: 66 BPM | SYSTOLIC BLOOD PRESSURE: 126 MMHG | OXYGEN SATURATION: 95 % | BODY MASS INDEX: 41.85 KG/M2

## 2024-07-16 DIAGNOSIS — Z13.6 ENCOUNTER FOR LIPID SCREENING FOR CARDIOVASCULAR DISEASE: ICD-10-CM

## 2024-07-16 DIAGNOSIS — Z13.220 ENCOUNTER FOR LIPID SCREENING FOR CARDIOVASCULAR DISEASE: ICD-10-CM

## 2024-07-16 DIAGNOSIS — Z00.00 MEDICARE ANNUAL WELLNESS VISIT, SUBSEQUENT: Primary | ICD-10-CM

## 2024-07-16 PROCEDURE — 3074F SYST BP LT 130 MM HG: CPT | Performed by: FAMILY MEDICINE

## 2024-07-16 PROCEDURE — 3078F DIAST BP <80 MM HG: CPT | Performed by: FAMILY MEDICINE

## 2024-07-16 PROCEDURE — G0439 PPPS, SUBSEQ VISIT: HCPCS | Performed by: FAMILY MEDICINE

## 2024-07-16 PROCEDURE — 1123F ACP DISCUSS/DSCN MKR DOCD: CPT | Performed by: FAMILY MEDICINE

## 2024-07-16 RX ORDER — BUPROPION HYDROCHLORIDE 150 MG/1
150 TABLET ORAL EVERY MORNING
Qty: 30 TABLET | Refills: 5 | Status: SHIPPED | OUTPATIENT
Start: 2024-07-16

## 2024-07-16 SDOH — ECONOMIC STABILITY: FOOD INSECURITY: WITHIN THE PAST 12 MONTHS, YOU WORRIED THAT YOUR FOOD WOULD RUN OUT BEFORE YOU GOT MONEY TO BUY MORE.: NEVER TRUE

## 2024-07-16 SDOH — ECONOMIC STABILITY: FOOD INSECURITY: WITHIN THE PAST 12 MONTHS, THE FOOD YOU BOUGHT JUST DIDN'T LAST AND YOU DIDN'T HAVE MONEY TO GET MORE.: NEVER TRUE

## 2024-07-16 SDOH — ECONOMIC STABILITY: INCOME INSECURITY: HOW HARD IS IT FOR YOU TO PAY FOR THE VERY BASICS LIKE FOOD, HOUSING, MEDICAL CARE, AND HEATING?: NOT HARD AT ALL

## 2024-07-16 ASSESSMENT — PATIENT HEALTH QUESTIONNAIRE - PHQ9
10. IF YOU CHECKED OFF ANY PROBLEMS, HOW DIFFICULT HAVE THESE PROBLEMS MADE IT FOR YOU TO DO YOUR WORK, TAKE CARE OF THINGS AT HOME, OR GET ALONG WITH OTHER PEOPLE: VERY DIFFICULT
6. FEELING BAD ABOUT YOURSELF - OR THAT YOU ARE A FAILURE OR HAVE LET YOURSELF OR YOUR FAMILY DOWN: NOT AT ALL
8. MOVING OR SPEAKING SO SLOWLY THAT OTHER PEOPLE COULD HAVE NOTICED. OR THE OPPOSITE, BEING SO FIGETY OR RESTLESS THAT YOU HAVE BEEN MOVING AROUND A LOT MORE THAN USUAL: NOT AT ALL
SUM OF ALL RESPONSES TO PHQ QUESTIONS 1-9: 18
SUM OF ALL RESPONSES TO PHQ QUESTIONS 1-9: 18
SUM OF ALL RESPONSES TO PHQ9 QUESTIONS 1 & 2: 6
2. FEELING DOWN, DEPRESSED OR HOPELESS: NEARLY EVERY DAY
7. TROUBLE CONCENTRATING ON THINGS, SUCH AS READING THE NEWSPAPER OR WATCHING TELEVISION: NEARLY EVERY DAY
SUM OF ALL RESPONSES TO PHQ QUESTIONS 1-9: 18
4. FEELING TIRED OR HAVING LITTLE ENERGY: NEARLY EVERY DAY
3. TROUBLE FALLING OR STAYING ASLEEP: NEARLY EVERY DAY
1. LITTLE INTEREST OR PLEASURE IN DOING THINGS: NEARLY EVERY DAY
5. POOR APPETITE OR OVEREATING: NEARLY EVERY DAY
9. THOUGHTS THAT YOU WOULD BE BETTER OFF DEAD, OR OF HURTING YOURSELF: NOT AT ALL
SUM OF ALL RESPONSES TO PHQ QUESTIONS 1-9: 18

## 2024-07-16 NOTE — PROGRESS NOTES
cervical lymphadenopathy  Pulmonary/Chest: clear to auscultation bilaterally- no wheezes, rales or rhonchi, normal air movement, no respiratory distress  Cardiovascular: normal rate, regular rhythm, normal S1 and S2, no murmurs, rubs, clicks, or gallops, distal pulses intact, no carotid bruits  Abdomen: soft, non-tender, non-distended, normal bowel sounds, no masses or organomegaly  Extremities: no cyanosis, clubbing or edema  Musculoskeletal: normal range of motion, no joint swelling, deformity or tenderness  Neurologic: reflexes normal and symmetric, no cranial nerve deficit, gait, coordination and speech normal            Allergies   Allergen Reactions    Doxycycline Other (See Comments)     dizzyness    Bactrim [Sulfamethoxazole-Trimethoprim]     Codeine     Penicillins     Percocet [Oxycodone-Acetaminophen]      Prior to Visit Medications    Medication Sig Taking? Authorizing Provider   buPROPion (WELLBUTRIN XL) 150 MG extended release tablet Take 1 tablet by mouth every morning Yes Gavin Whitlock MD   tolterodine (DETROL LA) 4 MG extended release capsule Take 1 capsule by mouth once daily Yes Gavin Whitlock MD   levothyroxine (SYNTHROID) 50 MCG tablet Take 1 tablet by mouth Daily Yes Gavin Whitlock MD   lisinopril-hydroCHLOROthiazide (PRINZIDE;ZESTORETIC) 20-25 MG per tablet Take 1 tablet by mouth daily Yes Gavin Whitlock MD   simvastatin (ZOCOR) 40 MG tablet Take 1 tablet by mouth nightly Yes Gavin Whitlock MD   FLUoxetine (PROZAC) 40 MG capsule Take 1 capsule by mouth daily Yes Gavin Whitlock MD   acetaminophen (TYLENOL) 325 MG tablet Take 2 tablets by mouth every 6 hours as needed for Pain Yes Ashley Garcia PA   esomeprazole (NEXIUM) 20 MG delayed release capsule Take 1 capsule by mouth every morning (before breakfast) Yes Gavin Whitlock MD       [facility] (Including outside providers/suppliers regularly involved in providing care):   Patient Care

## 2024-07-16 NOTE — PATIENT INSTRUCTIONS
programs and medicines. These can increase your chances of quitting for good. Quitting is one of the most important things you can do to protect your heart. It is never too late to quit. Try to avoid secondhand smoke too.     Stay at a weight that's healthy for you. Talk to your doctor if you need help losing weight.     Try to get 7 to 9 hours of sleep each night.     Limit alcohol to 2 drinks a day for men and 1 drink a day for women. Too much alcohol can cause health problems.     Manage other health problems such as diabetes, high blood pressure, and high cholesterol. If you think you may have a problem with alcohol or drug use, talk to your doctor.   Medicines    Take your medicines exactly as prescribed. Call your doctor if you think you are having a problem with your medicine.     If your doctor recommends aspirin, take the amount directed each day. Make sure you take aspirin and not another kind of pain reliever, such as acetaminophen (Tylenol).   When should you call for help?   Call 911 if you have symptoms of a heart attack. These may include:    Chest pain or pressure, or a strange feeling in the chest.     Sweating.     Shortness of breath.     Pain, pressure, or a strange feeling in the back, neck, jaw, or upper belly or in one or both shoulders or arms.     Lightheadedness or sudden weakness.     A fast or irregular heartbeat.   After you call 911, the  may tell you to chew 1 adult-strength or 2 to 4 low-dose aspirin. Wait for an ambulance. Do not try to drive yourself.  Watch closely for changes in your health, and be sure to contact your doctor if you have any problems.  Where can you learn more?  Go to https://www.[employer].net/patientEd and enter F075 to learn more about \"A Healthy Heart: Care Instructions.\"  Current as of: June 24, 2023  Content Version: 14.1  © 7792-0849 [employer].   Care instructions adapted under license by [employer]. If you have questions about a

## 2024-07-22 ENCOUNTER — HOSPITAL ENCOUNTER (OUTPATIENT)
Age: 84
Discharge: HOME | End: 2024-07-22
Payer: MEDICARE

## 2024-07-22 DIAGNOSIS — Z13.6 ENCOUNTER FOR LIPID SCREENING FOR CARDIOVASCULAR DISEASE: ICD-10-CM

## 2024-07-22 DIAGNOSIS — Z13.220 ENCOUNTER FOR LIPID SCREENING FOR CARDIOVASCULAR DISEASE: ICD-10-CM

## 2024-07-22 LAB
CHOLEST SERPL-MCNC: 139 MG/DL
CHOLESTEROL/HDL RATIO: 2.4
HDLC SERPL-MCNC: 59 MG/DL
LDLC SERPL CALC-MCNC: 58 MG/DL (ref 0–130)
TRIGL SERPL-MCNC: 109 MG/DL

## 2024-07-22 PROCEDURE — 36415 COLL VENOUS BLD VENIPUNCTURE: CPT

## 2024-07-22 PROCEDURE — 80061 LIPID PANEL: CPT

## 2024-08-01 DIAGNOSIS — N39.46 MIXED STRESS AND URGE URINARY INCONTINENCE: ICD-10-CM

## 2024-08-02 RX ORDER — TOLTERODINE 4 MG/1
4 CAPSULE, EXTENDED RELEASE ORAL DAILY
Qty: 30 CAPSULE | Refills: 0 | Status: SHIPPED | OUTPATIENT
Start: 2024-08-02

## 2024-08-02 NOTE — TELEPHONE ENCOUNTER
LAST VISIT:   11/24/2023     Future Appointments   Date Time Provider Department Center   1/15/2025  3:45 PM Gavin Whitlock MD [facility]

## 2024-08-23 NOTE — PATIENT INSTRUCTIONS
Personalized Preventive Plan for Babak Rizo - 9/30/2020  Medicare offers a range of preventive health benefits. Some of the tests and screenings are paid in full while other may be subject to a deductible, co-insurance, and/or copay. Some of these benefits include a comprehensive review of your medical history including lifestyle, illnesses that may run in your family, and various assessments and screenings as appropriate. After reviewing your medical record and screening and assessments performed today your provider may have ordered immunizations, labs, imaging, and/or referrals for you. A list of these orders (if applicable) as well as your Preventive Care list are included within your After Visit Summary for your review. Other Preventive Recommendations:    · A preventive eye exam performed by an eye specialist is recommended every 1-2 years to screen for glaucoma; cataracts, macular degeneration, and other eye disorders. · A preventive dental visit is recommended every 6 months. · Try to get at least 150 minutes of exercise per week or 10,000 steps per day on a pedometer . · Order or download the FREE \"Exercise & Physical Activity: Your Everyday Guide\" from [employer]. Call 5-452.747.6575 or search [employer] online. · You need 9357-2020 mg of calcium and 5155-5498 IU of vitamin D per day. It is possible to meet your calcium requirement with diet alone, but a vitamin D supplement is usually necessary to meet this goal.  · When exposed to the sun, use a sunscreen that protects against both UVA and UVB radiation with an SPF of 30 or greater. Reapply every 2 to 3 hours or after sweating, drying off with a towel, or swimming. · Always wear a seat belt when traveling in a car. Always wear a helmet when riding a bicycle or motorcycle.
SBIRT referral

## 2024-09-10 DIAGNOSIS — N39.46 MIXED STRESS AND URGE URINARY INCONTINENCE: ICD-10-CM

## 2024-09-12 RX ORDER — TOLTERODINE 4 MG/1
4 CAPSULE, EXTENDED RELEASE ORAL DAILY
Qty: 30 CAPSULE | Refills: 0 | Status: SHIPPED | OUTPATIENT
Start: 2024-09-12

## 2024-11-12 DIAGNOSIS — N39.46 MIXED STRESS AND URGE URINARY INCONTINENCE: ICD-10-CM

## 2024-11-13 RX ORDER — TOLTERODINE 4 MG/1
4 CAPSULE, EXTENDED RELEASE ORAL DAILY
Qty: 30 CAPSULE | Refills: 0 | Status: SHIPPED | OUTPATIENT
Start: 2024-11-13

## 2024-12-06 DIAGNOSIS — I10 ESSENTIAL HYPERTENSION: ICD-10-CM

## 2024-12-06 DIAGNOSIS — R60.0 LOCALIZED EDEMA: ICD-10-CM

## 2024-12-06 DIAGNOSIS — E78.5 HYPERLIPIDEMIA, UNSPECIFIED HYPERLIPIDEMIA TYPE: ICD-10-CM

## 2024-12-06 RX ORDER — SIMVASTATIN 40 MG
40 TABLET ORAL NIGHTLY
Qty: 90 TABLET | Refills: 3 | Status: SHIPPED | OUTPATIENT
Start: 2024-12-06

## 2024-12-06 RX ORDER — LISINOPRIL AND HYDROCHLOROTHIAZIDE 20; 25 MG/1; MG/1
1 TABLET ORAL DAILY
Qty: 90 TABLET | Refills: 3 | Status: SHIPPED | OUTPATIENT
Start: 2024-12-06

## 2024-12-07 DIAGNOSIS — F41.8 DEPRESSION WITH ANXIETY: ICD-10-CM

## 2024-12-07 DIAGNOSIS — E03.9 ACQUIRED HYPOTHYROIDISM: ICD-10-CM

## 2024-12-09 RX ORDER — BUPROPION HYDROCHLORIDE 150 MG/1
150 TABLET ORAL EVERY MORNING
Qty: 90 TABLET | Refills: 1 | Status: SHIPPED | OUTPATIENT
Start: 2024-12-09

## 2024-12-09 RX ORDER — FLUOXETINE 40 MG/1
CAPSULE ORAL DAILY
Qty: 90 CAPSULE | Refills: 1 | Status: SHIPPED | OUTPATIENT
Start: 2024-12-09

## 2024-12-09 RX ORDER — LEVOTHYROXINE SODIUM 50 UG/1
50 TABLET ORAL DAILY
Qty: 90 TABLET | Refills: 1 | Status: SHIPPED | OUTPATIENT
Start: 2024-12-09

## 2024-12-17 DIAGNOSIS — N39.46 MIXED STRESS AND URGE URINARY INCONTINENCE: ICD-10-CM

## 2024-12-18 RX ORDER — TOLTERODINE 4 MG/1
4 CAPSULE, EXTENDED RELEASE ORAL DAILY
Qty: 30 CAPSULE | Refills: 0 | Status: SHIPPED | OUTPATIENT
Start: 2024-12-18

## 2025-01-23 DIAGNOSIS — N39.46 MIXED STRESS AND URGE URINARY INCONTINENCE: ICD-10-CM

## 2025-01-23 RX ORDER — TOLTERODINE 4 MG/1
4 CAPSULE, EXTENDED RELEASE ORAL DAILY
Qty: 30 CAPSULE | Refills: 0 | Status: SHIPPED | OUTPATIENT
Start: 2025-01-23

## 2025-04-07 DIAGNOSIS — E03.9 ACQUIRED HYPOTHYROIDISM: ICD-10-CM

## 2025-04-07 DIAGNOSIS — F41.8 DEPRESSION WITH ANXIETY: ICD-10-CM

## 2025-04-07 RX ORDER — LEVOTHYROXINE SODIUM 50 UG/1
50 TABLET ORAL DAILY
Qty: 90 TABLET | Refills: 1 | OUTPATIENT
Start: 2025-04-07

## 2025-04-07 RX ORDER — FLUOXETINE HYDROCHLORIDE 40 MG/1
40 CAPSULE ORAL DAILY
Qty: 90 CAPSULE | Refills: 1 | OUTPATIENT
Start: 2025-04-07

## 2025-04-07 RX ORDER — BUPROPION HYDROCHLORIDE 150 MG/1
150 TABLET ORAL EVERY MORNING
Qty: 90 TABLET | Refills: 0 | Status: SHIPPED | OUTPATIENT
Start: 2025-04-07

## 2025-04-09 DIAGNOSIS — F41.8 DEPRESSION WITH ANXIETY: ICD-10-CM

## 2025-04-09 RX ORDER — FLUOXETINE HYDROCHLORIDE 40 MG/1
40 CAPSULE ORAL DAILY
Qty: 90 CAPSULE | Refills: 1 | Status: SHIPPED | OUTPATIENT
Start: 2025-04-09

## 2025-04-16 ENCOUNTER — TELEPHONE (OUTPATIENT)
Dept: PRIMARY CARE CLINIC | Age: 85
End: 2025-04-16

## 2025-05-08 ENCOUNTER — OFFICE VISIT (OUTPATIENT)
Dept: PRIMARY CARE CLINIC | Age: 85
End: 2025-05-08
Payer: MEDICARE

## 2025-05-08 VITALS
SYSTOLIC BLOOD PRESSURE: 136 MMHG | DIASTOLIC BLOOD PRESSURE: 84 MMHG | BODY MASS INDEX: 43.06 KG/M2 | HEIGHT: 62 IN | OXYGEN SATURATION: 98 % | HEART RATE: 68 BPM | WEIGHT: 234 LBS

## 2025-05-08 DIAGNOSIS — N39.46 MIXED STRESS AND URGE URINARY INCONTINENCE: ICD-10-CM

## 2025-05-08 DIAGNOSIS — M15.0 PRIMARY OSTEOARTHRITIS INVOLVING MULTIPLE JOINTS: ICD-10-CM

## 2025-05-08 DIAGNOSIS — F41.8 DEPRESSION WITH ANXIETY: ICD-10-CM

## 2025-05-08 DIAGNOSIS — Z00.00 MEDICARE ANNUAL WELLNESS VISIT, SUBSEQUENT: Primary | ICD-10-CM

## 2025-05-08 DIAGNOSIS — E03.9 ACQUIRED HYPOTHYROIDISM: ICD-10-CM

## 2025-05-08 DIAGNOSIS — I10 PRIMARY HYPERTENSION: ICD-10-CM

## 2025-05-08 PROCEDURE — G0439 PPPS, SUBSEQ VISIT: HCPCS | Performed by: FAMILY MEDICINE

## 2025-05-08 PROCEDURE — 1159F MED LIST DOCD IN RCRD: CPT | Performed by: FAMILY MEDICINE

## 2025-05-08 PROCEDURE — 1123F ACP DISCUSS/DSCN MKR DOCD: CPT | Performed by: FAMILY MEDICINE

## 2025-05-08 RX ORDER — FLUOXETINE HYDROCHLORIDE 40 MG/1
40 CAPSULE ORAL DAILY
Qty: 90 CAPSULE | Refills: 1 | Status: SHIPPED | OUTPATIENT
Start: 2025-05-08

## 2025-05-08 RX ORDER — TOLTERODINE 4 MG/1
4 CAPSULE, EXTENDED RELEASE ORAL DAILY
Qty: 30 CAPSULE | Refills: 0 | Status: SHIPPED | OUTPATIENT
Start: 2025-05-08

## 2025-05-08 RX ORDER — BUPROPION HYDROCHLORIDE 150 MG/1
150 TABLET ORAL EVERY MORNING
Qty: 90 TABLET | Refills: 3 | Status: SHIPPED | OUTPATIENT
Start: 2025-05-08

## 2025-05-08 RX ORDER — ETODOLAC 400 MG/1
400 TABLET, FILM COATED ORAL 2 TIMES DAILY
Qty: 60 TABLET | Refills: 3 | Status: SHIPPED | OUTPATIENT
Start: 2025-05-08 | End: 2026-05-08

## 2025-05-08 RX ORDER — LEVOTHYROXINE SODIUM 50 UG/1
50 TABLET ORAL DAILY
Qty: 90 TABLET | Refills: 3 | Status: SHIPPED | OUTPATIENT
Start: 2025-05-08

## 2025-05-08 SDOH — ECONOMIC STABILITY: FOOD INSECURITY: WITHIN THE PAST 12 MONTHS, THE FOOD YOU BOUGHT JUST DIDN'T LAST AND YOU DIDN'T HAVE MONEY TO GET MORE.: NEVER TRUE

## 2025-05-08 SDOH — ECONOMIC STABILITY: FOOD INSECURITY: WITHIN THE PAST 12 MONTHS, YOU WORRIED THAT YOUR FOOD WOULD RUN OUT BEFORE YOU GOT MONEY TO BUY MORE.: NEVER TRUE

## 2025-05-08 ASSESSMENT — PATIENT HEALTH QUESTIONNAIRE - PHQ9
SUM OF ALL RESPONSES TO PHQ QUESTIONS 1-9: 15
4. FEELING TIRED OR HAVING LITTLE ENERGY: NEARLY EVERY DAY
5. POOR APPETITE OR OVEREATING: NOT AT ALL
9. THOUGHTS THAT YOU WOULD BE BETTER OFF DEAD, OR OF HURTING YOURSELF: NOT AT ALL
8. MOVING OR SPEAKING SO SLOWLY THAT OTHER PEOPLE COULD HAVE NOTICED. OR THE OPPOSITE, BEING SO FIGETY OR RESTLESS THAT YOU HAVE BEEN MOVING AROUND A LOT MORE THAN USUAL: NOT AT ALL
2. FEELING DOWN, DEPRESSED OR HOPELESS: NEARLY EVERY DAY
6. FEELING BAD ABOUT YOURSELF - OR THAT YOU ARE A FAILURE OR HAVE LET YOURSELF OR YOUR FAMILY DOWN: NOT AT ALL
SUM OF ALL RESPONSES TO PHQ QUESTIONS 1-9: 15
10. IF YOU CHECKED OFF ANY PROBLEMS, HOW DIFFICULT HAVE THESE PROBLEMS MADE IT FOR YOU TO DO YOUR WORK, TAKE CARE OF THINGS AT HOME, OR GET ALONG WITH OTHER PEOPLE: SOMEWHAT DIFFICULT
SUM OF ALL RESPONSES TO PHQ QUESTIONS 1-9: 15
7. TROUBLE CONCENTRATING ON THINGS, SUCH AS READING THE NEWSPAPER OR WATCHING TELEVISION: NEARLY EVERY DAY
1. LITTLE INTEREST OR PLEASURE IN DOING THINGS: NEARLY EVERY DAY
SUM OF ALL RESPONSES TO PHQ QUESTIONS 1-9: 15
3. TROUBLE FALLING OR STAYING ASLEEP: NEARLY EVERY DAY

## 2025-05-08 NOTE — PROGRESS NOTES
Medicare Annual Wellness Visit    Stephanie Pitt is here for Medicare AWV (Medicare wellness visit )    Assessment & Plan   Medicare annual wellness visit, subsequent  Mixed stress and urge urinary incontinence  -     tolterodine (DETROL LA) 4 MG extended release capsule; Take 1 capsule by mouth daily, Disp-30 capsule, R-0Normal  Depression with anxiety  -     buPROPion (WELLBUTRIN XL) 150 MG extended release tablet; Take 1 tablet by mouth every morning, Disp-90 tablet, R-3Normal  -     FLUoxetine (PROZAC) 40 MG capsule; Take 1 capsule by mouth daily, Disp-90 capsule, R-1Normal  -     [facility])  Acquired hypothyroidism  -     levothyroxine (SYNTHROID) 50 MCG tablet; Take 1 tablet by mouth daily, Disp-90 tablet, R-3Normal  -     T4, Free; Future  -     TSH; Future  Primary hypertension  -     Basic Metabolic Panel, Fasting; Future  Primary osteoarthritis involving multiple joints  -     etodolac (LODINE) 400 MG tablet; Take 1 tablet by mouth 2 times daily, Disp-60 tablet, R-3Normal       Return in about 6 months (around 11/8/2025).     Subjective   The following acute and/or chronic problems were also addressed today:  Depression     Patient's complete Health Risk Assessment and screening values have been reviewed and are found in Flowsheets. The following problems were reviewed today and where indicated follow up appointments were made and/or referrals ordered.    Positive Risk Factor Screenings with Interventions:    Fall Risk:  Do you feel unsteady or are you worried about falling? : (!) yes  2 or more falls in past year?: no  Fall with injury in past year?: no     Interventions:    Reviewed medications, home hazards, visual acuity, and co-morbidities that can increase risk for falls     Depression:  PHQ-2 Score: 6  PHQ-9 Total Score: 15  Total Score Interpretation: 15-19 = moderately severe depression  Interventions:  Patient declines any further evaluation or treatment

## 2025-05-08 NOTE — PATIENT INSTRUCTIONS
prepare for a fight. This reaction is often called \"fight or flight.\" When you get angry, adrenaline and other hormones are released into your blood. Then your blood pressure goes up, your heart beats faster, and you breathe faster.  When you express anger in a healthy way, it can inspire change and make you productive. But if you don't have the skills to express anger in a healthy way, anger can build up. You may hurt others--and yourself--emotionally and even physically. Violent behavior often starts with verbal threats or fairly minor incidents. But over time, it can involve physical harm. It can include physical, verbal, or sexual abuse of an intimate partner (domestic violence), a child (child abuse), or an older adult (elder abuse).  It can also make you sick. Anger and constant hostility keep your blood pressure high. They raise your chances of having other health problems, such as depression, a heart attack, or a stroke. Some people with post-traumatic stress disorder (PTSD) feel angry and on alert all the time.  It may feel like there are no other ways to react when you are angry. But when you learn healthy ways to work with anger, it no longer controls you.  How can you manage your anger?  The first step to managing anger is to be more aware of it. Note the thoughts, feelings, and emotions that you have when you get angry. Practice noticing these signs of anger when you are calm. If you are more aware of the signs of anger, you can take steps to manage it. Here are a few tips:  Think before you act. Take time to stop and cool down when you feel yourself getting angry. Count to 10 while you take slow, steady breaths. Practice some other form of mental relaxation.  Learn the feelings that lead to angry outbursts. Anger and hostility may be a symptom of unhappy feelings or depression about your job, your relationship, or other aspects of your personal life.  Try to avoid situations that lead to angry

## 2025-06-10 ENCOUNTER — OFFICE VISIT (OUTPATIENT)
Dept: BEHAVIORAL/MENTAL HEALTH CLINIC | Age: 85
End: 2025-06-10
Payer: MEDICARE

## 2025-06-10 DIAGNOSIS — F33.1 MODERATE EPISODE OF RECURRENT MAJOR DEPRESSIVE DISORDER (HCC): Primary | ICD-10-CM

## 2025-06-10 PROCEDURE — 90791 PSYCH DIAGNOSTIC EVALUATION: CPT | Performed by: COUNSELOR

## 2025-06-10 PROCEDURE — 1123F ACP DISCUSS/DSCN MKR DOCD: CPT | Performed by: COUNSELOR

## 2025-06-10 NOTE — PROGRESS NOTES
volume - WNL  Verbalizations were goal directed.  Thought processes were intact and goal-oriented without evidence of delusions, hallucinations, obsessions, or brooke; with moderate cognitive distortions.   Associations were characterized by intact cognitive processes.  Pt was oriented to person, place, time, and general circumstances;  recent:  good and remote:  good.  Insight and judgment were estimated to be fair, AEB, a fair  understanding of cyclical maladaptive patterns, and the ability to use insight to inform behavior change.   Attention span and concentration appear within functional limits  Language use and knowledge base appear within functional limits        CURRENT MEDICATIONS    Current Outpatient Medications:     tolterodine (DETROL LA) 4 MG extended release capsule, Take 1 capsule by mouth daily, Disp: 30 capsule, Rfl: 0    buPROPion (WELLBUTRIN XL) 150 MG extended release tablet, Take 1 tablet by mouth every morning, Disp: 90 tablet, Rfl: 3    FLUoxetine (PROZAC) 40 MG capsule, Take 1 capsule by mouth daily, Disp: 90 capsule, Rfl: 1    levothyroxine (SYNTHROID) 50 MCG tablet, Take 1 tablet by mouth daily, Disp: 90 tablet, Rfl: 3    etodolac (LODINE) 400 MG tablet, Take 1 tablet by mouth 2 times daily, Disp: 60 tablet, Rfl: 3    lisinopril-hydroCHLOROthiazide (PRINZIDE;ZESTORETIC) 20-25 MG per tablet, Take 1 tablet by mouth daily, Disp: 90 tablet, Rfl: 3    simvastatin (ZOCOR) 40 MG tablet, Take 1 tablet by mouth nightly, Disp: 90 tablet, Rfl: 3    acetaminophen (TYLENOL) 325 MG tablet, Take 2 tablets by mouth every 6 hours as needed for Pain, Disp: 60 tablet, Rfl: 0     FAMILY MEDICAL/MH HISTORY   Her family history includes Hypertension in her father and mother; Other in her brother; Stroke in her father and mother.    PATIENT MENTAL HEALTH HISTORY  Client stated she has had difficulties with hearing loss for some time, but is having increased difficulty hearing today. This disrupted communication

## 2025-06-12 ASSESSMENT — PATIENT HEALTH QUESTIONNAIRE - PHQ9
4. FEELING TIRED OR HAVING LITTLE ENERGY: NEARLY EVERY DAY
SUM OF ALL RESPONSES TO PHQ QUESTIONS 1-9: 14
SUM OF ALL RESPONSES TO PHQ QUESTIONS 1-9: 14
10. IF YOU CHECKED OFF ANY PROBLEMS, HOW DIFFICULT HAVE THESE PROBLEMS MADE IT FOR YOU TO DO YOUR WORK, TAKE CARE OF THINGS AT HOME, OR GET ALONG WITH OTHER PEOPLE: SOMEWHAT DIFFICULT
SUM OF ALL RESPONSES TO PHQ QUESTIONS 1-9: 14
3. TROUBLE FALLING OR STAYING ASLEEP: MORE THAN HALF THE DAYS
8. MOVING OR SPEAKING SO SLOWLY THAT OTHER PEOPLE COULD HAVE NOTICED. OR THE OPPOSITE, BEING SO FIGETY OR RESTLESS THAT YOU HAVE BEEN MOVING AROUND A LOT MORE THAN USUAL: NOT AT ALL
9. THOUGHTS THAT YOU WOULD BE BETTER OFF DEAD, OR OF HURTING YOURSELF: NOT AT ALL
SUM OF ALL RESPONSES TO PHQ QUESTIONS 1-9: 14
6. FEELING BAD ABOUT YOURSELF - OR THAT YOU ARE A FAILURE OR HAVE LET YOURSELF OR YOUR FAMILY DOWN: SEVERAL DAYS
1. LITTLE INTEREST OR PLEASURE IN DOING THINGS: NEARLY EVERY DAY
5. POOR APPETITE OR OVEREATING: NOT AT ALL
2. FEELING DOWN, DEPRESSED OR HOPELESS: NEARLY EVERY DAY
7. TROUBLE CONCENTRATING ON THINGS, SUCH AS READING THE NEWSPAPER OR WATCHING TELEVISION: MORE THAN HALF THE DAYS

## 2025-07-16 ENCOUNTER — TELEPHONE (OUTPATIENT)
Dept: PRIMARY CARE CLINIC | Age: 85
End: 2025-07-16

## 2025-07-29 ENCOUNTER — TELEPHONE (OUTPATIENT)
Dept: PRIMARY CARE CLINIC | Age: 85
End: 2025-07-29

## 2025-07-29 NOTE — TELEPHONE ENCOUNTER
----- Message from Adri OSCAR sent at 7/29/2025  9:29 AM EDT -----  Regarding: E[facility] Escalation To Practice  ECC Escalation To Practice      Type of Escalation: Red Flag Symptom  --------------------------------------------------------------------------------------------------------------------------    Information for Provider:  Patient is looking for appointment for: Symptom : swollen feet   Reasons for Message: Patient disconnected and red flag refusal     Additional Information : patient had appointment today but she can't come because she swollen feet   --------------------------------------------------------------------------------------------------------------------------    Relationship to Patient: Self  Call Back Info: OK to leave message on voicemail  Preferred Call Back Number: Phone [phone] (home)